# Patient Record
Sex: FEMALE | Race: WHITE | NOT HISPANIC OR LATINO | URBAN - METROPOLITAN AREA
[De-identification: names, ages, dates, MRNs, and addresses within clinical notes are randomized per-mention and may not be internally consistent; named-entity substitution may affect disease eponyms.]

---

## 2018-01-01 ENCOUNTER — INPATIENT (INPATIENT)
Facility: HOSPITAL | Age: 58
LOS: 3 days | Discharge: HOME | DRG: 246 | End: 2018-02-27
Attending: INTERNAL MEDICINE | Admitting: INTERNAL MEDICINE
Payer: COMMERCIAL

## 2018-01-01 ENCOUNTER — TRANSCRIPTION ENCOUNTER (OUTPATIENT)
Age: 58
End: 2018-01-01

## 2018-01-01 VITALS
HEIGHT: 61 IN | HEART RATE: 124 BPM | DIASTOLIC BLOOD PRESSURE: 78 MMHG | RESPIRATION RATE: 20 BRPM | WEIGHT: 231.04 LBS | OXYGEN SATURATION: 95 % | SYSTOLIC BLOOD PRESSURE: 140 MMHG

## 2018-01-01 VITALS — WEIGHT: 223.33 LBS

## 2018-01-01 DIAGNOSIS — E66.01 MORBID (SEVERE) OBESITY DUE TO EXCESS CALORIES: ICD-10-CM

## 2018-01-01 DIAGNOSIS — I48.0 PAROXYSMAL ATRIAL FIBRILLATION: ICD-10-CM

## 2018-01-01 DIAGNOSIS — I50.32 CHRONIC DIASTOLIC (CONGESTIVE) HEART FAILURE: ICD-10-CM

## 2018-01-01 DIAGNOSIS — J44.9 CHRONIC OBSTRUCTIVE PULMONARY DISEASE, UNSPECIFIED: ICD-10-CM

## 2018-01-01 DIAGNOSIS — J18.9 PNEUMONIA, UNSPECIFIED ORGANISM: ICD-10-CM

## 2018-01-01 DIAGNOSIS — Z87.891 PERSONAL HISTORY OF NICOTINE DEPENDENCE: ICD-10-CM

## 2018-01-01 DIAGNOSIS — I25.110 ATHEROSCLEROTIC HEART DISEASE OF NATIVE CORONARY ARTERY WITH UNSTABLE ANGINA PECTORIS: ICD-10-CM

## 2018-01-01 DIAGNOSIS — Z29.9 ENCOUNTER FOR PROPHYLACTIC MEASURES, UNSPECIFIED: ICD-10-CM

## 2018-01-01 DIAGNOSIS — I34.0 NONRHEUMATIC MITRAL (VALVE) INSUFFICIENCY: ICD-10-CM

## 2018-01-01 DIAGNOSIS — I50.33 ACUTE ON CHRONIC DIASTOLIC (CONGESTIVE) HEART FAILURE: ICD-10-CM

## 2018-01-01 DIAGNOSIS — I27.20 PULMONARY HYPERTENSION, UNSPECIFIED: ICD-10-CM

## 2018-01-01 DIAGNOSIS — R07.9 CHEST PAIN, UNSPECIFIED: ICD-10-CM

## 2018-01-01 DIAGNOSIS — I11.0 HYPERTENSIVE HEART DISEASE WITH HEART FAILURE: ICD-10-CM

## 2018-01-01 DIAGNOSIS — I21.4 NON-ST ELEVATION (NSTEMI) MYOCARDIAL INFARCTION: ICD-10-CM

## 2018-01-01 DIAGNOSIS — Z88.0 ALLERGY STATUS TO PENICILLIN: ICD-10-CM

## 2018-01-01 DIAGNOSIS — R94.31 ABNORMAL ELECTROCARDIOGRAM [ECG] [EKG]: ICD-10-CM

## 2018-01-01 DIAGNOSIS — F32.9 MAJOR DEPRESSIVE DISORDER, SINGLE EPISODE, UNSPECIFIED: ICD-10-CM

## 2018-01-01 LAB
ALBUMIN SERPL ELPH-MCNC: 3.8 G/DL — SIGNIFICANT CHANGE UP (ref 3.3–5)
ALBUMIN SERPL ELPH-MCNC: 3.8 G/DL — SIGNIFICANT CHANGE UP (ref 3.3–5)
ALP SERPL-CCNC: 70 U/L — SIGNIFICANT CHANGE UP (ref 40–120)
ALP SERPL-CCNC: 74 U/L — SIGNIFICANT CHANGE UP (ref 40–120)
ALT FLD-CCNC: 22 U/L — SIGNIFICANT CHANGE UP (ref 10–45)
ALT FLD-CCNC: 27 U/L — SIGNIFICANT CHANGE UP (ref 10–45)
ANION GAP SERPL CALC-SCNC: 11 MMOL/L — SIGNIFICANT CHANGE UP (ref 5–17)
ANION GAP SERPL CALC-SCNC: 11 MMOL/L — SIGNIFICANT CHANGE UP (ref 5–17)
ANION GAP SERPL CALC-SCNC: 12 MMOL/L — SIGNIFICANT CHANGE UP (ref 5–17)
APTT BLD: 102.3 SEC — HIGH (ref 27.5–37.4)
APTT BLD: 136 SEC — CRITICAL HIGH (ref 27.5–37.4)
APTT BLD: 34.2 SEC — SIGNIFICANT CHANGE UP (ref 27.5–37.4)
APTT BLD: 40.5 SEC — HIGH (ref 27.5–37.4)
APTT BLD: 45.4 SEC — HIGH (ref 27.5–37.4)
APTT BLD: 54.6 SEC — HIGH (ref 27.5–37.4)
APTT BLD: 57.9 SEC — HIGH (ref 27.5–37.4)
APTT BLD: 62.8 SEC — HIGH (ref 27.5–37.4)
APTT BLD: 63.9 SEC — HIGH (ref 27.5–37.4)
APTT BLD: 74 SEC — HIGH (ref 27.5–37.4)
APTT BLD: 81.2 SEC — HIGH (ref 27.5–37.4)
AST SERPL-CCNC: 18 U/L — SIGNIFICANT CHANGE UP (ref 10–40)
AST SERPL-CCNC: 33 U/L — SIGNIFICANT CHANGE UP (ref 10–40)
BASE EXCESS BLDA CALC-SCNC: 3.3 MMOL/L — HIGH (ref -2–3)
BASOPHILS NFR BLD AUTO: 0.4 % — SIGNIFICANT CHANGE UP (ref 0–2)
BILIRUB SERPL-MCNC: 0.5 MG/DL — SIGNIFICANT CHANGE UP (ref 0.2–1.2)
BILIRUB SERPL-MCNC: 0.6 MG/DL — SIGNIFICANT CHANGE UP (ref 0.2–1.2)
BLD GP AB SCN SERPL QL: NEGATIVE — SIGNIFICANT CHANGE UP
BUN SERPL-MCNC: 12 MG/DL — SIGNIFICANT CHANGE UP (ref 7–23)
BUN SERPL-MCNC: 14 MG/DL — SIGNIFICANT CHANGE UP (ref 7–23)
BUN SERPL-MCNC: 16 MG/DL — SIGNIFICANT CHANGE UP (ref 7–23)
BUN SERPL-MCNC: 18 MG/DL — SIGNIFICANT CHANGE UP (ref 7–23)
BUN SERPL-MCNC: 21 MG/DL — SIGNIFICANT CHANGE UP (ref 7–23)
CALCIUM SERPL-MCNC: 8.5 MG/DL — SIGNIFICANT CHANGE UP (ref 8.4–10.5)
CALCIUM SERPL-MCNC: 8.6 MG/DL — SIGNIFICANT CHANGE UP (ref 8.4–10.5)
CALCIUM SERPL-MCNC: 8.9 MG/DL — SIGNIFICANT CHANGE UP (ref 8.4–10.5)
CALCIUM SERPL-MCNC: 9.2 MG/DL — SIGNIFICANT CHANGE UP (ref 8.4–10.5)
CALCIUM SERPL-MCNC: 9.2 MG/DL — SIGNIFICANT CHANGE UP (ref 8.4–10.5)
CHLORIDE SERPL-SCNC: 100 MMOL/L — SIGNIFICANT CHANGE UP (ref 96–108)
CHLORIDE SERPL-SCNC: 101 MMOL/L — SIGNIFICANT CHANGE UP (ref 96–108)
CHLORIDE SERPL-SCNC: 97 MMOL/L — SIGNIFICANT CHANGE UP (ref 96–108)
CHLORIDE SERPL-SCNC: 98 MMOL/L — SIGNIFICANT CHANGE UP (ref 96–108)
CHLORIDE SERPL-SCNC: 99 MMOL/L — SIGNIFICANT CHANGE UP (ref 96–108)
CHOLEST SERPL-MCNC: 172 MG/DL — SIGNIFICANT CHANGE UP (ref 10–199)
CK MB CFR SERPL CALC: 13.4 NG/ML — HIGH (ref 0–6.7)
CK MB CFR SERPL CALC: 5.4 NG/ML — SIGNIFICANT CHANGE UP (ref 0–6.7)
CK SERPL-CCNC: 312 U/L — HIGH (ref 25–170)
CK SERPL-CCNC: 635 U/L — HIGH (ref 25–170)
CO2 SERPL-SCNC: 26 MMOL/L — SIGNIFICANT CHANGE UP (ref 22–31)
CO2 SERPL-SCNC: 28 MMOL/L — SIGNIFICANT CHANGE UP (ref 22–31)
CO2 SERPL-SCNC: 28 MMOL/L — SIGNIFICANT CHANGE UP (ref 22–31)
CO2 SERPL-SCNC: 29 MMOL/L — SIGNIFICANT CHANGE UP (ref 22–31)
CO2 SERPL-SCNC: 30 MMOL/L — SIGNIFICANT CHANGE UP (ref 22–31)
CREAT SERPL-MCNC: 0.89 MG/DL — SIGNIFICANT CHANGE UP (ref 0.5–1.3)
CREAT SERPL-MCNC: 1.05 MG/DL — SIGNIFICANT CHANGE UP (ref 0.5–1.3)
CREAT SERPL-MCNC: 1.09 MG/DL — SIGNIFICANT CHANGE UP (ref 0.5–1.3)
CREAT SERPL-MCNC: 1.16 MG/DL — SIGNIFICANT CHANGE UP (ref 0.5–1.3)
CREAT SERPL-MCNC: 1.26 MG/DL — SIGNIFICANT CHANGE UP (ref 0.5–1.3)
EOSINOPHIL NFR BLD AUTO: 3 % — SIGNIFICANT CHANGE UP (ref 0–6)
GLUCOSE SERPL-MCNC: 105 MG/DL — HIGH (ref 70–99)
GLUCOSE SERPL-MCNC: 107 MG/DL — HIGH (ref 70–99)
GLUCOSE SERPL-MCNC: 88 MG/DL — SIGNIFICANT CHANGE UP (ref 70–99)
GLUCOSE SERPL-MCNC: 92 MG/DL — SIGNIFICANT CHANGE UP (ref 70–99)
GLUCOSE SERPL-MCNC: 99 MG/DL — SIGNIFICANT CHANGE UP (ref 70–99)
HCO3 BLDA-SCNC: 27 MMOL/L — SIGNIFICANT CHANGE UP (ref 21–28)
HCT VFR BLD CALC: 36.6 % — SIGNIFICANT CHANGE UP (ref 34.5–45)
HCT VFR BLD CALC: 38 % — SIGNIFICANT CHANGE UP (ref 34.5–45)
HCT VFR BLD CALC: 38.9 % — SIGNIFICANT CHANGE UP (ref 34.5–45)
HCT VFR BLD CALC: 40.5 % — SIGNIFICANT CHANGE UP (ref 34.5–45)
HCT VFR BLD CALC: 41.2 % — SIGNIFICANT CHANGE UP (ref 34.5–45)
HDLC SERPL-MCNC: 80 MG/DL — SIGNIFICANT CHANGE UP (ref 40–125)
HGB BLD-MCNC: 11.8 G/DL — SIGNIFICANT CHANGE UP (ref 11.5–15.5)
HGB BLD-MCNC: 12 G/DL — SIGNIFICANT CHANGE UP (ref 11.5–15.5)
HGB BLD-MCNC: 12.5 G/DL — SIGNIFICANT CHANGE UP (ref 11.5–15.5)
HGB BLD-MCNC: 13.2 G/DL — SIGNIFICANT CHANGE UP (ref 11.5–15.5)
HGB BLD-MCNC: 13.5 G/DL — SIGNIFICANT CHANGE UP (ref 11.5–15.5)
INR BLD: 1.03 — SIGNIFICANT CHANGE UP (ref 0.88–1.16)
INR BLD: 1.17 — HIGH (ref 0.88–1.16)
INR BLD: 1.37 — HIGH (ref 0.88–1.16)
LIPID PNL WITH DIRECT LDL SERPL: 74 MG/DL — SIGNIFICANT CHANGE UP
LYMPHOCYTES # BLD AUTO: 22.8 % — SIGNIFICANT CHANGE UP (ref 13–44)
MAGNESIUM SERPL-MCNC: 2 MG/DL — SIGNIFICANT CHANGE UP (ref 1.6–2.6)
MAGNESIUM SERPL-MCNC: 2.1 MG/DL — SIGNIFICANT CHANGE UP (ref 1.6–2.6)
MAGNESIUM SERPL-MCNC: 2.3 MG/DL — SIGNIFICANT CHANGE UP (ref 1.6–2.6)
MCHC RBC-ENTMCNC: 28.4 PG — SIGNIFICANT CHANGE UP (ref 27–34)
MCHC RBC-ENTMCNC: 28.7 PG — SIGNIFICANT CHANGE UP (ref 27–34)
MCHC RBC-ENTMCNC: 28.9 PG — SIGNIFICANT CHANGE UP (ref 27–34)
MCHC RBC-ENTMCNC: 29 PG — SIGNIFICANT CHANGE UP (ref 27–34)
MCHC RBC-ENTMCNC: 29.1 PG — SIGNIFICANT CHANGE UP (ref 27–34)
MCHC RBC-ENTMCNC: 31.6 G/DL — LOW (ref 32–36)
MCHC RBC-ENTMCNC: 32.1 G/DL — SIGNIFICANT CHANGE UP (ref 32–36)
MCHC RBC-ENTMCNC: 32.2 G/DL — SIGNIFICANT CHANGE UP (ref 32–36)
MCHC RBC-ENTMCNC: 32.6 G/DL — SIGNIFICANT CHANGE UP (ref 32–36)
MCHC RBC-ENTMCNC: 32.8 G/DL — SIGNIFICANT CHANGE UP (ref 32–36)
MCV RBC AUTO: 88.4 FL — SIGNIFICANT CHANGE UP (ref 80–100)
MCV RBC AUTO: 89.2 FL — SIGNIFICANT CHANGE UP (ref 80–100)
MCV RBC AUTO: 89.4 FL — SIGNIFICANT CHANGE UP (ref 80–100)
MCV RBC AUTO: 89.5 FL — SIGNIFICANT CHANGE UP (ref 80–100)
MCV RBC AUTO: 89.8 FL — SIGNIFICANT CHANGE UP (ref 80–100)
MONOCYTES NFR BLD AUTO: 9.2 % — SIGNIFICANT CHANGE UP (ref 2–14)
NEUTROPHILS NFR BLD AUTO: 64.6 % — SIGNIFICANT CHANGE UP (ref 43–77)
NT-PROBNP SERPL-SCNC: 1383 PG/ML — HIGH (ref 0–300)
NT-PROBNP SERPL-SCNC: 8166 PG/ML — HIGH (ref 0–300)
PCO2 BLDA: 40 MMHG — SIGNIFICANT CHANGE UP (ref 32–45)
PH BLDA: 7.46 — HIGH (ref 7.35–7.45)
PLATELET # BLD AUTO: 255 K/UL — SIGNIFICANT CHANGE UP (ref 150–400)
PLATELET # BLD AUTO: 265 K/UL — SIGNIFICANT CHANGE UP (ref 150–400)
PLATELET # BLD AUTO: 286 K/UL — SIGNIFICANT CHANGE UP (ref 150–400)
PLATELET # BLD AUTO: 290 K/UL — SIGNIFICANT CHANGE UP (ref 150–400)
PLATELET # BLD AUTO: 292 K/UL — SIGNIFICANT CHANGE UP (ref 150–400)
PO2 BLDA: 68 MMHG — LOW (ref 83–108)
POTASSIUM SERPL-MCNC: 3.6 MMOL/L — SIGNIFICANT CHANGE UP (ref 3.5–5.3)
POTASSIUM SERPL-MCNC: 3.7 MMOL/L — SIGNIFICANT CHANGE UP (ref 3.5–5.3)
POTASSIUM SERPL-MCNC: 3.8 MMOL/L — SIGNIFICANT CHANGE UP (ref 3.5–5.3)
POTASSIUM SERPL-MCNC: 4 MMOL/L — SIGNIFICANT CHANGE UP (ref 3.5–5.3)
POTASSIUM SERPL-MCNC: 4.2 MMOL/L — SIGNIFICANT CHANGE UP (ref 3.5–5.3)
POTASSIUM SERPL-SCNC: 3.6 MMOL/L — SIGNIFICANT CHANGE UP (ref 3.5–5.3)
POTASSIUM SERPL-SCNC: 3.7 MMOL/L — SIGNIFICANT CHANGE UP (ref 3.5–5.3)
POTASSIUM SERPL-SCNC: 3.8 MMOL/L — SIGNIFICANT CHANGE UP (ref 3.5–5.3)
POTASSIUM SERPL-SCNC: 4 MMOL/L — SIGNIFICANT CHANGE UP (ref 3.5–5.3)
POTASSIUM SERPL-SCNC: 4.2 MMOL/L — SIGNIFICANT CHANGE UP (ref 3.5–5.3)
PROT SERPL-MCNC: 7.1 G/DL — SIGNIFICANT CHANGE UP (ref 6–8.3)
PROT SERPL-MCNC: 7.2 G/DL — SIGNIFICANT CHANGE UP (ref 6–8.3)
PROTHROM AB SERPL-ACNC: 11.4 SEC — SIGNIFICANT CHANGE UP (ref 9.8–12.7)
PROTHROM AB SERPL-ACNC: 13 SEC — HIGH (ref 9.8–12.7)
PROTHROM AB SERPL-ACNC: 15.3 SEC — HIGH (ref 9.8–12.7)
RBC # BLD: 4.09 M/UL — SIGNIFICANT CHANGE UP (ref 3.8–5.2)
RBC # BLD: 4.23 M/UL — SIGNIFICANT CHANGE UP (ref 3.8–5.2)
RBC # BLD: 4.35 M/UL — SIGNIFICANT CHANGE UP (ref 3.8–5.2)
RBC # BLD: 4.54 M/UL — SIGNIFICANT CHANGE UP (ref 3.8–5.2)
RBC # BLD: 4.66 M/UL — SIGNIFICANT CHANGE UP (ref 3.8–5.2)
RBC # FLD: 14.2 % — SIGNIFICANT CHANGE UP (ref 10.3–16.9)
RBC # FLD: 14.4 % — SIGNIFICANT CHANGE UP (ref 10.3–16.9)
RBC # FLD: 14.4 % — SIGNIFICANT CHANGE UP (ref 10.3–16.9)
RBC # FLD: 14.6 % — SIGNIFICANT CHANGE UP (ref 10.3–16.9)
RBC # FLD: 14.7 % — SIGNIFICANT CHANGE UP (ref 10.3–16.9)
RH IG SCN BLD-IMP: POSITIVE — SIGNIFICANT CHANGE UP
SAO2 % BLDA: 94 % — LOW (ref 95–100)
SODIUM SERPL-SCNC: 137 MMOL/L — SIGNIFICANT CHANGE UP (ref 135–145)
SODIUM SERPL-SCNC: 138 MMOL/L — SIGNIFICANT CHANGE UP (ref 135–145)
SODIUM SERPL-SCNC: 139 MMOL/L — SIGNIFICANT CHANGE UP (ref 135–145)
SODIUM SERPL-SCNC: 139 MMOL/L — SIGNIFICANT CHANGE UP (ref 135–145)
SODIUM SERPL-SCNC: 141 MMOL/L — SIGNIFICANT CHANGE UP (ref 135–145)
TOTAL CHOLESTEROL/HDL RATIO MEASUREMENT: 2.2 RATIO — LOW (ref 3.3–7.1)
TRIGL SERPL-MCNC: 91 MG/DL — SIGNIFICANT CHANGE UP (ref 10–149)
TROPONIN T SERPL-MCNC: 0.05 NG/ML — CRITICAL HIGH (ref 0–0.01)
TROPONIN T SERPL-MCNC: 0.1 NG/ML — CRITICAL HIGH (ref 0–0.01)
WBC # BLD: 7.8 K/UL — SIGNIFICANT CHANGE UP (ref 3.8–10.5)
WBC # BLD: 8.2 K/UL — SIGNIFICANT CHANGE UP (ref 3.8–10.5)
WBC # BLD: 9 K/UL — SIGNIFICANT CHANGE UP (ref 3.8–10.5)
WBC # BLD: 9.2 K/UL — SIGNIFICANT CHANGE UP (ref 3.8–10.5)
WBC # BLD: 9.5 K/UL — SIGNIFICANT CHANGE UP (ref 3.8–10.5)
WBC # FLD AUTO: 7.8 K/UL — SIGNIFICANT CHANGE UP (ref 3.8–10.5)
WBC # FLD AUTO: 8.2 K/UL — SIGNIFICANT CHANGE UP (ref 3.8–10.5)
WBC # FLD AUTO: 9 K/UL — SIGNIFICANT CHANGE UP (ref 3.8–10.5)
WBC # FLD AUTO: 9.2 K/UL — SIGNIFICANT CHANGE UP (ref 3.8–10.5)
WBC # FLD AUTO: 9.5 K/UL — SIGNIFICANT CHANGE UP (ref 3.8–10.5)

## 2018-01-01 PROCEDURE — 86900 BLOOD TYPING SEROLOGIC ABO: CPT

## 2018-01-01 PROCEDURE — 83735 ASSAY OF MAGNESIUM: CPT

## 2018-01-01 PROCEDURE — 71046 X-RAY EXAM CHEST 2 VIEWS: CPT | Mod: 26

## 2018-01-01 PROCEDURE — 71045 X-RAY EXAM CHEST 1 VIEW: CPT | Mod: 26

## 2018-01-01 PROCEDURE — 99222 1ST HOSP IP/OBS MODERATE 55: CPT

## 2018-01-01 PROCEDURE — 94010 BREATHING CAPACITY TEST: CPT | Mod: 26

## 2018-01-01 PROCEDURE — C1725: CPT

## 2018-01-01 PROCEDURE — 85025 COMPLETE CBC W/AUTO DIFF WBC: CPT

## 2018-01-01 PROCEDURE — 93010 ELECTROCARDIOGRAM REPORT: CPT

## 2018-01-01 PROCEDURE — 36415 COLL VENOUS BLD VENIPUNCTURE: CPT

## 2018-01-01 PROCEDURE — 82550 ASSAY OF CK (CPK): CPT

## 2018-01-01 PROCEDURE — 93005 ELECTROCARDIOGRAM TRACING: CPT

## 2018-01-01 PROCEDURE — C1874: CPT

## 2018-01-01 PROCEDURE — C1887: CPT

## 2018-01-01 PROCEDURE — 82803 BLOOD GASES ANY COMBINATION: CPT

## 2018-01-01 PROCEDURE — 83880 ASSAY OF NATRIURETIC PEPTIDE: CPT

## 2018-01-01 PROCEDURE — 71045 X-RAY EXAM CHEST 1 VIEW: CPT

## 2018-01-01 PROCEDURE — 93306 TTE W/DOPPLER COMPLETE: CPT

## 2018-01-01 PROCEDURE — 93306 TTE W/DOPPLER COMPLETE: CPT | Mod: 26

## 2018-01-01 PROCEDURE — 93880 EXTRACRANIAL BILAT STUDY: CPT

## 2018-01-01 PROCEDURE — 71046 X-RAY EXAM CHEST 2 VIEWS: CPT

## 2018-01-01 PROCEDURE — C1753: CPT

## 2018-01-01 PROCEDURE — 99239 HOSP IP/OBS DSCHRG MGMT >30: CPT

## 2018-01-01 PROCEDURE — 86901 BLOOD TYPING SEROLOGIC RH(D): CPT

## 2018-01-01 PROCEDURE — 90686 IIV4 VACC NO PRSV 0.5 ML IM: CPT

## 2018-01-01 PROCEDURE — 84484 ASSAY OF TROPONIN QUANT: CPT

## 2018-01-01 PROCEDURE — 85027 COMPLETE CBC AUTOMATED: CPT

## 2018-01-01 PROCEDURE — 99233 SBSQ HOSP IP/OBS HIGH 50: CPT

## 2018-01-01 PROCEDURE — 86850 RBC ANTIBODY SCREEN: CPT

## 2018-01-01 PROCEDURE — C1894: CPT

## 2018-01-01 PROCEDURE — 80053 COMPREHEN METABOLIC PANEL: CPT

## 2018-01-01 PROCEDURE — 92978 ENDOLUMINL IVUS OCT C 1ST: CPT | Mod: 26,LC

## 2018-01-01 PROCEDURE — 85730 THROMBOPLASTIN TIME PARTIAL: CPT

## 2018-01-01 PROCEDURE — 93460 R&L HRT ART/VENTRICLE ANGIO: CPT | Mod: 26,XU

## 2018-01-01 PROCEDURE — 93880 EXTRACRANIAL BILAT STUDY: CPT | Mod: 26

## 2018-01-01 PROCEDURE — 85610 PROTHROMBIN TIME: CPT

## 2018-01-01 PROCEDURE — 80061 LIPID PANEL: CPT

## 2018-01-01 PROCEDURE — 94150 VITAL CAPACITY TEST: CPT

## 2018-01-01 PROCEDURE — 82553 CREATINE MB FRACTION: CPT

## 2018-01-01 PROCEDURE — C1769: CPT

## 2018-01-01 PROCEDURE — 80048 BASIC METABOLIC PNL TOTAL CA: CPT

## 2018-01-01 PROCEDURE — 92928 PRQ TCAT PLMT NTRAC ST 1 LES: CPT | Mod: LC

## 2018-01-01 RX ORDER — CLOPIDOGREL BISULFATE 75 MG/1
600 TABLET, FILM COATED ORAL ONCE
Qty: 0 | Refills: 0 | Status: COMPLETED | OUTPATIENT
Start: 2018-01-01 | End: 2018-01-01

## 2018-01-01 RX ORDER — NYSTATIN CREAM 100000 [USP'U]/G
1 CREAM TOPICAL THREE TIMES A DAY
Qty: 0 | Refills: 0 | Status: DISCONTINUED | OUTPATIENT
Start: 2018-01-01 | End: 2018-01-01

## 2018-01-01 RX ORDER — APIXABAN 2.5 MG/1
1 TABLET, FILM COATED ORAL
Qty: 0 | Refills: 0 | COMMUNITY

## 2018-01-01 RX ORDER — ESCITALOPRAM OXALATE 10 MG/1
1 TABLET, FILM COATED ORAL
Qty: 0 | Refills: 0 | COMMUNITY

## 2018-01-01 RX ORDER — ASPIRIN/CALCIUM CARB/MAGNESIUM 324 MG
81 TABLET ORAL DAILY
Qty: 0 | Refills: 0 | Status: DISCONTINUED | OUTPATIENT
Start: 2018-01-01 | End: 2018-01-01

## 2018-01-01 RX ORDER — NYSTATIN CREAM 100000 [USP'U]/G
1 CREAM TOPICAL
Qty: 0 | Refills: 0 | COMMUNITY
Start: 2018-01-01

## 2018-01-01 RX ORDER — POTASSIUM CHLORIDE 20 MEQ
40 PACKET (EA) ORAL ONCE
Qty: 0 | Refills: 0 | Status: COMPLETED | OUTPATIENT
Start: 2018-01-01 | End: 2018-01-01

## 2018-01-01 RX ORDER — APIXABAN 2.5 MG/1
5 TABLET, FILM COATED ORAL EVERY 12 HOURS
Qty: 0 | Refills: 0 | Status: DISCONTINUED | OUTPATIENT
Start: 2018-01-01 | End: 2018-01-01

## 2018-01-01 RX ORDER — AZITHROMYCIN 500 MG/1
250 TABLET, FILM COATED ORAL DAILY
Qty: 0 | Refills: 0 | Status: DISCONTINUED | OUTPATIENT
Start: 2018-01-01 | End: 2018-01-01

## 2018-01-01 RX ORDER — AMIODARONE HYDROCHLORIDE 400 MG/1
1 TABLET ORAL
Qty: 0 | Refills: 0 | COMMUNITY

## 2018-01-01 RX ORDER — MAGNESIUM OXIDE 400 MG ORAL TABLET 241.3 MG
800 TABLET ORAL ONCE
Qty: 0 | Refills: 0 | Status: COMPLETED | OUTPATIENT
Start: 2018-01-01 | End: 2018-01-01

## 2018-01-01 RX ORDER — AMIODARONE HYDROCHLORIDE 400 MG/1
200 TABLET ORAL DAILY
Qty: 0 | Refills: 0 | Status: DISCONTINUED | OUTPATIENT
Start: 2018-01-01 | End: 2018-01-01

## 2018-01-01 RX ORDER — CLOPIDOGREL BISULFATE 75 MG/1
75 TABLET, FILM COATED ORAL DAILY
Qty: 0 | Refills: 0 | Status: DISCONTINUED | OUTPATIENT
Start: 2018-01-01 | End: 2018-01-01

## 2018-01-01 RX ORDER — ATORVASTATIN CALCIUM 80 MG/1
40 TABLET, FILM COATED ORAL AT BEDTIME
Qty: 0 | Refills: 0 | Status: DISCONTINUED | OUTPATIENT
Start: 2018-01-01 | End: 2018-01-01

## 2018-01-01 RX ORDER — ATORVASTATIN CALCIUM 80 MG/1
1 TABLET, FILM COATED ORAL
Qty: 30 | Refills: 3 | OUTPATIENT
Start: 2018-01-01 | End: 2018-06-26

## 2018-01-01 RX ORDER — FUROSEMIDE 40 MG
20 TABLET ORAL
Qty: 0 | Refills: 0 | Status: DISCONTINUED | OUTPATIENT
Start: 2018-01-01 | End: 2018-01-01

## 2018-01-01 RX ORDER — INFLUENZA VIRUS VACCINE 15; 15; 15; 15 UG/.5ML; UG/.5ML; UG/.5ML; UG/.5ML
0.5 SUSPENSION INTRAMUSCULAR ONCE
Qty: 0 | Refills: 0 | Status: COMPLETED | OUTPATIENT
Start: 2018-01-01 | End: 2018-01-01

## 2018-01-01 RX ORDER — METOPROLOL TARTRATE 50 MG
1 TABLET ORAL
Qty: 0 | Refills: 0 | COMMUNITY

## 2018-01-01 RX ORDER — ASPIRIN/CALCIUM CARB/MAGNESIUM 324 MG
1 TABLET ORAL
Qty: 30 | Refills: 0 | OUTPATIENT
Start: 2018-01-01 | End: 2018-03-28

## 2018-01-01 RX ORDER — METOPROLOL TARTRATE 50 MG
1 TABLET ORAL
Qty: 30 | Refills: 3 | OUTPATIENT
Start: 2018-01-01 | End: 2018-06-26

## 2018-01-01 RX ORDER — METOPROLOL TARTRATE 50 MG
25 TABLET ORAL THREE TIMES A DAY
Qty: 0 | Refills: 0 | Status: DISCONTINUED | OUTPATIENT
Start: 2018-01-01 | End: 2018-01-01

## 2018-01-01 RX ORDER — LOSARTAN POTASSIUM 100 MG/1
1 TABLET, FILM COATED ORAL
Qty: 0 | Refills: 0 | COMMUNITY

## 2018-01-01 RX ORDER — ASPIRIN/CALCIUM CARB/MAGNESIUM 324 MG
243 TABLET ORAL ONCE
Qty: 0 | Refills: 0 | Status: COMPLETED | OUTPATIENT
Start: 2018-01-01 | End: 2018-01-01

## 2018-01-01 RX ORDER — HEPARIN SODIUM 5000 [USP'U]/ML
4000 INJECTION INTRAVENOUS; SUBCUTANEOUS EVERY 6 HOURS
Qty: 0 | Refills: 0 | Status: DISCONTINUED | OUTPATIENT
Start: 2018-01-01 | End: 2018-01-01

## 2018-01-01 RX ORDER — CLOPIDOGREL BISULFATE 75 MG/1
1 TABLET, FILM COATED ORAL
Qty: 30 | Refills: 11 | OUTPATIENT
Start: 2018-01-01 | End: 2019-02-21

## 2018-01-01 RX ORDER — HEPARIN SODIUM 5000 [USP'U]/ML
8500 INJECTION INTRAVENOUS; SUBCUTANEOUS EVERY 6 HOURS
Qty: 0 | Refills: 0 | Status: DISCONTINUED | OUTPATIENT
Start: 2018-01-01 | End: 2018-01-01

## 2018-01-01 RX ORDER — HEPARIN SODIUM 5000 [USP'U]/ML
1400 INJECTION INTRAVENOUS; SUBCUTANEOUS
Qty: 25000 | Refills: 0 | Status: DISCONTINUED | OUTPATIENT
Start: 2018-01-01 | End: 2018-01-01

## 2018-01-01 RX ORDER — ESCITALOPRAM OXALATE 10 MG/1
10 TABLET, FILM COATED ORAL DAILY
Qty: 0 | Refills: 0 | Status: DISCONTINUED | OUTPATIENT
Start: 2018-01-01 | End: 2018-01-01

## 2018-01-01 RX ORDER — HEPARIN SODIUM 5000 [USP'U]/ML
1300 INJECTION INTRAVENOUS; SUBCUTANEOUS
Qty: 25000 | Refills: 0 | Status: DISCONTINUED | OUTPATIENT
Start: 2018-01-01 | End: 2018-01-01

## 2018-01-01 RX ORDER — FUROSEMIDE 40 MG
1 TABLET ORAL
Qty: 0 | Refills: 0 | COMMUNITY

## 2018-01-01 RX ORDER — LOSARTAN POTASSIUM 100 MG/1
25 TABLET, FILM COATED ORAL DAILY
Qty: 0 | Refills: 0 | Status: DISCONTINUED | OUTPATIENT
Start: 2018-01-01 | End: 2018-01-01

## 2018-01-01 RX ORDER — DILTIAZEM HCL 120 MG
20 CAPSULE, EXT RELEASE 24 HR ORAL
Qty: 125 | Refills: 0 | Status: DISCONTINUED | OUTPATIENT
Start: 2018-01-01 | End: 2018-01-01

## 2018-01-01 RX ADMIN — Medication 20 MILLIGRAM(S): at 17:26

## 2018-01-01 RX ADMIN — Medication 20 MILLIGRAM(S): at 22:10

## 2018-01-01 RX ADMIN — HEPARIN SODIUM 1200 UNIT(S)/HR: 5000 INJECTION INTRAVENOUS; SUBCUTANEOUS at 03:55

## 2018-01-01 RX ADMIN — INFLUENZA VIRUS VACCINE 0.5 MILLILITER(S): 15; 15; 15; 15 SUSPENSION INTRAMUSCULAR at 11:47

## 2018-01-01 RX ADMIN — APIXABAN 5 MILLIGRAM(S): 2.5 TABLET, FILM COATED ORAL at 05:22

## 2018-01-01 RX ADMIN — Medication 25 MILLIGRAM(S): at 13:34

## 2018-01-01 RX ADMIN — HEPARIN SODIUM 1400 UNIT(S)/HR: 5000 INJECTION INTRAVENOUS; SUBCUTANEOUS at 01:07

## 2018-01-01 RX ADMIN — Medication 40 MILLIEQUIVALENT(S): at 09:37

## 2018-01-01 RX ADMIN — NYSTATIN CREAM 1 APPLICATION(S): 100000 CREAM TOPICAL at 21:44

## 2018-01-01 RX ADMIN — Medication 25 MILLIGRAM(S): at 21:08

## 2018-01-01 RX ADMIN — Medication 20 MILLIGRAM(S): at 05:21

## 2018-01-01 RX ADMIN — AMIODARONE HYDROCHLORIDE 200 MILLIGRAM(S): 400 TABLET ORAL at 12:39

## 2018-01-01 RX ADMIN — ATORVASTATIN CALCIUM 40 MILLIGRAM(S): 80 TABLET, FILM COATED ORAL at 21:52

## 2018-01-01 RX ADMIN — Medication 81 MILLIGRAM(S): at 07:16

## 2018-01-01 RX ADMIN — HEPARIN SODIUM 1200 UNIT(S)/HR: 5000 INJECTION INTRAVENOUS; SUBCUTANEOUS at 21:09

## 2018-01-01 RX ADMIN — Medication 25 MILLIGRAM(S): at 21:44

## 2018-01-01 RX ADMIN — Medication 20 MILLIGRAM(S): at 18:19

## 2018-01-01 RX ADMIN — HEPARIN SODIUM 0 UNIT(S)/HR: 5000 INJECTION INTRAVENOUS; SUBCUTANEOUS at 00:40

## 2018-01-01 RX ADMIN — Medication 20 MG/HR: at 19:50

## 2018-01-01 RX ADMIN — LOSARTAN POTASSIUM 25 MILLIGRAM(S): 100 TABLET, FILM COATED ORAL at 05:18

## 2018-01-01 RX ADMIN — HEPARIN SODIUM 1700 UNIT(S)/HR: 5000 INJECTION INTRAVENOUS; SUBCUTANEOUS at 15:47

## 2018-01-01 RX ADMIN — Medication 25 MILLIGRAM(S): at 19:40

## 2018-01-01 RX ADMIN — Medication 25 MILLIGRAM(S): at 05:18

## 2018-01-01 RX ADMIN — LOSARTAN POTASSIUM 25 MILLIGRAM(S): 100 TABLET, FILM COATED ORAL at 05:21

## 2018-01-01 RX ADMIN — Medication 81 MILLIGRAM(S): at 11:28

## 2018-01-01 RX ADMIN — LOSARTAN POTASSIUM 25 MILLIGRAM(S): 100 TABLET, FILM COATED ORAL at 05:22

## 2018-01-01 RX ADMIN — ATORVASTATIN CALCIUM 40 MILLIGRAM(S): 80 TABLET, FILM COATED ORAL at 21:08

## 2018-01-01 RX ADMIN — ATORVASTATIN CALCIUM 40 MILLIGRAM(S): 80 TABLET, FILM COATED ORAL at 21:44

## 2018-01-01 RX ADMIN — AMIODARONE HYDROCHLORIDE 200 MILLIGRAM(S): 400 TABLET ORAL at 05:21

## 2018-01-01 RX ADMIN — HEPARIN SODIUM 1200 UNIT(S)/HR: 5000 INJECTION INTRAVENOUS; SUBCUTANEOUS at 14:36

## 2018-01-01 RX ADMIN — NYSTATIN CREAM 1 APPLICATION(S): 100000 CREAM TOPICAL at 05:23

## 2018-01-01 RX ADMIN — Medication 25 MILLIGRAM(S): at 05:16

## 2018-01-01 RX ADMIN — Medication 20 MILLIGRAM(S): at 05:16

## 2018-01-01 RX ADMIN — HEPARIN SODIUM 1500 UNIT(S)/HR: 5000 INJECTION INTRAVENOUS; SUBCUTANEOUS at 02:36

## 2018-01-01 RX ADMIN — HEPARIN SODIUM 1300 UNIT(S)/HR: 5000 INJECTION INTRAVENOUS; SUBCUTANEOUS at 19:40

## 2018-01-01 RX ADMIN — Medication 40 MILLIEQUIVALENT(S): at 11:45

## 2018-01-01 RX ADMIN — HEPARIN SODIUM 1400 UNIT(S)/HR: 5000 INJECTION INTRAVENOUS; SUBCUTANEOUS at 07:45

## 2018-01-01 RX ADMIN — Medication 20 MILLIGRAM(S): at 05:22

## 2018-01-01 RX ADMIN — Medication 20 MILLIGRAM(S): at 19:29

## 2018-01-01 RX ADMIN — LOSARTAN POTASSIUM 25 MILLIGRAM(S): 100 TABLET, FILM COATED ORAL at 05:16

## 2018-01-01 RX ADMIN — CLOPIDOGREL BISULFATE 600 MILLIGRAM(S): 75 TABLET, FILM COATED ORAL at 10:43

## 2018-01-01 RX ADMIN — AMIODARONE HYDROCHLORIDE 200 MILLIGRAM(S): 400 TABLET ORAL at 05:22

## 2018-01-01 RX ADMIN — HEPARIN SODIUM 4000 UNIT(S): 5000 INJECTION INTRAVENOUS; SUBCUTANEOUS at 02:40

## 2018-01-01 RX ADMIN — Medication 81 MILLIGRAM(S): at 11:45

## 2018-01-01 RX ADMIN — MAGNESIUM OXIDE 400 MG ORAL TABLET 800 MILLIGRAM(S): 241.3 TABLET ORAL at 11:45

## 2018-01-01 RX ADMIN — CLOPIDOGREL BISULFATE 75 MILLIGRAM(S): 75 TABLET, FILM COATED ORAL at 11:47

## 2018-01-01 RX ADMIN — Medication 20 MILLIGRAM(S): at 05:18

## 2018-01-01 RX ADMIN — Medication 25 MILLIGRAM(S): at 06:31

## 2018-01-01 RX ADMIN — Medication 25 MILLIGRAM(S): at 05:21

## 2018-01-01 RX ADMIN — Medication 243 MILLIGRAM(S): at 10:43

## 2018-01-01 RX ADMIN — Medication 25 MILLIGRAM(S): at 21:52

## 2018-01-01 RX ADMIN — HEPARIN SODIUM 1700 UNIT(S)/HR: 5000 INJECTION INTRAVENOUS; SUBCUTANEOUS at 09:08

## 2018-02-23 NOTE — H&P ADULT - PROBLEM SELECTOR PLAN 2
- paroxysmal afib with RVR to 130s. Pt normally NSR at home  - pt on Amiodarone 200mg daily, Toprol XL 25mg daily. Pt on Cardizem drip at 20ml/hr on admission. Started Lopressor 25mg every 8 hours and off titrating Cardizem drip.

## 2018-02-23 NOTE — H&P ADULT - HISTORY OF PRESENT ILLNESS
58 yo female, former smoker, PMHx HTN, chronic diastolic CHF, paroxysmal afib (on Eliquis), COPD (no hosp/intubations/no meds at home), depression presented to [place] on 2/23/18 endorsing sudden onset of shortness of breath lasting 1 hour prior to admission. 56 yo morbidly obese female, former smoker, PMHx HTN, chronic diastolic CHF, paroxysmal afib (on Eliquis), COPD (no hosp/intubations/no meds at home), depression presented to [place] on 2/22/18 endorsing sudden onset of shortness of breath lasting 1 hour prior to admission. Pt has baseline 2 pillow orthopnea. Denied chest pain, dizziness, palpitations, nausea, syncope, LE swelling. Pt was admitted for NSTEMI with acute on chronic diastolic CHF exacerbation in the setting of rapid afib. Pt required BIPAP for respiratory support and was transitioned to NC. Pt put on Heparin drip for AC and NSTEMI and Cardizem drip for HR control. Echo on 2/23/18 showed EF 50-55% with diastolic dysfunction and moderate MR. Technically limited study. Pt was transferred to [facility] on 2/23/18 for cardiac intervention. On arrival to [facility] pt's HR 130s on Cardizem 15cc/hr. Hep drip at 12cc/hr. PTT 45. Pt's Cardizem drip increased to 20cc/hr and Heparin drip increased to 13cc/hr. SBP in 140s. Pt on 4 LNC O2 sat 94%. Pt afebrile and asymptomatic on transfer. 58 yo morbidly obese female, former smoker, PMHx HTN, chronic diastolic CHF, paroxysmal afib (on Eliquis), COPD (no hosp/intubations/no meds at home), depression presented to [place] on 2/22/18 endorsing sudden onset of shortness of breath lasting 1 hour prior to admission. Pt has baseline 2 pillow orthopnea. Denied chest pain, dizziness, palpitations, nausea, syncope, LE swelling, fever, chills. Pt was admitted for respiratory distress requiring BIPAP. WBC 13.1. XRay chest showed diffuse lung infiltrates and pulmonary edema. CT chest 2/22 showed CHF/fluid overload but cannot r/o PNA. Pulmonary artery hypertension. UA negative. Pt was given Meropenem 1g IV, Rocephin x 1, and Azithromycin 500mg x 1. Pt was weaned off BIPAP to NC on 2/22 PM. Pt ruled in NSTEMI with peak troponin of 2.45 and was started on a heparin drip. EKG in afib had nonspecific ST changes. Pt converted to NSR on 2/23/18 at 10am with no ischemic changes. Pt converted back to Afib with RVR and was put on a Cardizem drip on 2/23 at 4am. Echo on 2/23/18, technically limited study, EF 50-55%, mild LVH, and moderate MR. Pt was transferred to [facility] for cardiac catheterization. On arrival to [facility] pt asymptomatic. O2 sat 96% on 4LNC. Pt afebrile, no leukocytosis. EKG Afib with RVR to 120s with nonspecifc ST changes. Lungs CTA b/l. CXR shows mild increased vascular markings. No consolidations. Pt denies chest pain, sob, dizziness, cough, dizziness, palpitations, nausea. Dr. Dumont contacted who would like her HR better controlled prior to cardiac catheterization. Plan is to do cardiac cath on Monday with Dr Evans.

## 2018-02-23 NOTE — H&P ADULT - PROBLEM SELECTOR PLAN 4
- CXR and CT chest at [place] can't r/o PNA  - Pt was given Meropenem 1g x 1, Rocephin x 1, and Azithromycin 500mg on 2/22 and 2/23 on admission at [place] for sepsis protocol 2/2 pt tachycardic (in rapid afib), WBC 13.1, and hypoxic and hypercapnic, needing BIPAP.   -On arrival to [facility] pt asymptomatic, afebrile, no leukocytosis, and CXR showed no consolidations/infiltrates.  - UA negative at [place].  - Finish 5 days of Azithromycin.

## 2018-02-23 NOTE — H&P ADULT - PMH
Chronic diastolic CHF (congestive heart failure)    NSTEMI (non-ST elevated myocardial infarction)    Paroxysmal atrial fibrillation

## 2018-02-23 NOTE — H&P ADULT - FAMILY HISTORY
Mother  Still living? Unknown  Family history of coronary artery disease, Age at diagnosis: Age Unknown     Sibling  Still living? Unknown  Family history of coronary artery disease, Age at diagnosis: Age Unknown

## 2018-02-23 NOTE — H&P ADULT - ASSESSMENT
58 yo female, former smoker, PMHx HTN, chronic diastolic CHF, paroxysmal afib (on Eliquis), COPD (no hosp/intubations/no meds at home), depression presented to [place] on 2/22/18 endorsing sudden onset of shortness of breath lasting 1 hour prior to admission. Denied chest pain, dizziness, palpitations, nausea, syncope, LE swelling. Pt was admitted for NSTEMI with acute on chronic diastolic CHF exacerbation in the setting of rapid afib. Pt required BIPAP for respiratory support and was transitioned to NC. Pt put on Heparin drip for AC and NSTEMI and Cardizem drip for HR control. Echo on 2/23/18 showed EF 50-55% with diastolic dysfunction and moderate MR. Technically limited study. Pt was transferred to [facility] on 2/23/18 for cardiac intervention. On arrival to [facility] pt's HR 130s on Cardizem 15cc/hr. Hep drip at 12cc/hr. PTT 45. Pt's Cardizem drip increased to 20cc/hr and Heparin drip increased to 13cc/hr. SBP in 140s. Pt on 4 LNC O2 sat 94%. Pt afebrile and asymptomatic on transfer. 58 yo morbidly obese female, former smoker, PMHx HTN, chronic diastolic CHF, paroxysmal afib (on Eliquis), COPD (no hosp/intubations/no meds at home), depression presented to [place] on 2/22/18 endorsing sudden onset of shortness of breath lasting 1 hour prior to admission. Pt has baseline 2 pillow orthopnea. Denied chest pain, dizziness, palpitations, nausea, syncope, LE swelling, fever, chills. Pt was admitted for respiratory distress requiring BIPAP. WBC 13.1. XRay chest showed diffuse lung infiltrates and pulmonary edema. CT chest 2/22 showed CHF/fluid overload but cannot r/o PNA. Pulmonary artery hypertension. UA negative. Pt was given Meropenem 1g IV, Rocephin x 1, and Azithromycin 500mg x 1. Pt was weaned off BIPAP to NC on 2/22 PM. Pt ruled in NSTEMI with peak troponin of 2.45 and was started on a heparin drip. EKG in afib had nonspecific ST changes. Pt converted to NSR on 2/23/18 at 10am with no ischemic changes. Pt converted back to Afib with RVR and was put on a Cardizem drip on 2/23 at 4am. Echo on 2/23/18, technically limited study, EF 50-55%, mild LVH, and moderate MR. Pt was transferred to [facility] for cardiac catheterization. On arrival to [facility] pt asymptomatic. O2 sat 96% on 4LNC. Pt afebrile, no leukocytosis. EKG Afib with RVR to 120s with nonspecifc ST changes. Lungs CTA b/l. CXR shows mild increased vascular markings. No consolidations. Pt denies chest pain, sob, dizziness, cough, dizziness, palpitations, nausea. Dr. Dumont contacted who would like her HR better controlled prior to cardiac catheterization. Plan is to do cardiac cath on Monday with Dr Evans.

## 2018-02-23 NOTE — H&P ADULT - PROBLEM SELECTOR PLAN 3
- Chest Xray and CT chest no contrast showed fluid overload but couldn't r/o PNA  - Pt on Lasix 20mg daily at home. BNP 8000s on transfer to [facility].   - Start Lasix 20mg IV BID inpatient. Strict Is and Os. No patel as pt is mobile.  - Echo at [place] technically limited study with EF 50-55%. Mod MR. Echo ordered to be done on 2/24/18 with Definity for better study.

## 2018-02-23 NOTE — H&P ADULT - PROBLEM SELECTOR PLAN 1
- pt r/i NSTEMI with peak trop 1.8. - pt r/i NSTEMI with peak trop 2.45 at [place]  - Started on Heparin drip for ACS and AC  - Transferred for cardiac cath. Pt in rapid afib on 2/23 PM and cath was deferred to Monday with Dr. Evans. Confirm with Dr. Evans about load. Consent in chart.   - Continue Aspirin 81mg daily

## 2018-02-24 NOTE — DIETITIAN INITIAL EVALUATION ADULT. - NS AS NUTRI INTERV ED CONTENT
cardiac medical nutrition therapy and weight management nutrition therapy/Purpose of the nutrition education

## 2018-02-24 NOTE — DIETITIAN INITIAL EVALUATION ADULT. - PROBLEM SELECTOR PLAN 1
- pt r/i NSTEMI with peak trop 2.45 at [place]  - Started on Heparin drip for ACS and AC  - Transferred for cardiac cath. Pt in rapid afib on 2/23 PM and cath was deferred to Monday with Dr. Evans. Confirm with Dr. Evans about load. Consent in chart.   - Continue Aspirin 81mg daily

## 2018-02-24 NOTE — DIETITIAN INITIAL EVALUATION ADULT. - OTHER INFO
58yo F w/ h/o diastolic CHF (chronic), depression, presented to [place] on 2/22/18 with SOB and orthopnea. Now being treated for CHF/fluid overload -cannot r/o PNA. Pt currently on DASH/TLC diet and tolerating PO. Endorses good appetite, consuming >75% meals. Denies GI distress. Pt reports adhering to a lower sodium diet in the past but as of lately cooks with salt and consumes high Na pre-packaged foods. Experiences difficulty with losing weight and finding time to cook fresh meals. RD provided written and oral edu on cardiac nutition therapy and meal planning. Pt was receptive and expressed understanding. Pt inquired about wt management as well. NKFA or dietary restrictions. Skin and GI WDL per flowsheet.

## 2018-02-24 NOTE — PROGRESS NOTE ADULT - PROBLEM SELECTOR PLAN 2
- paroxysmal afib with RVR to 130s. Pt normally NSR at home  - pt on Amiodarone 200mg daily, Toprol XL 25mg daily. Pt on Cardizem drip at 20ml/hr on admission. Started Lopressor 25mg every 8 hours and off titrating Cardizem drip. - in NSR currently. No conversion pauses. AFIB was started about 2 years ago; was less in frequency but has been more recently.   - COntinue Metoprolol 25 mg TID  - On amiodarone at home. Got a dose this morning.  - Due to prolonged QTc, I d/c Amio order for - in NSR currently. No conversion pauses. AFIB was started about 2 years ago; was less in frequency but has been more recently.   - Continue Metoprolol 25 mg TID  - On amiodarone 200 mg daily at home. Got a dose this morning.  - Due to prolonged QTc, I discontinued Amio order until further notice from EP. DR. Wei is called for consult - question is what to do about Amio.

## 2018-02-24 NOTE — DIETITIAN INITIAL EVALUATION ADULT. - PROBLEM SELECTOR PLAN 3
- Chest Xray and CT chest no contrast showed fluid overload but couldn't r/o PNA  - Pt on Lasix 20mg daily at home. BNP 8000s on transfer to [facility].   - Start Lasix 20mg IV BID inpatient. Strict Is and Os. No patel as pt is mobile.  - Echo at [place] technically limited study with EF 50-55%. Mod MR. Echo ordered to be done on 2/24/18 with Definity for better study.

## 2018-02-24 NOTE — PROGRESS NOTE ADULT - PROBLEM SELECTOR PLAN 4
- CXR and CT chest at [place] can't r/o PNA  - Pt was given Meropenem 1g x 1, Rocephin x 1, and Azithromycin 500mg on 2/22 and 2/23 on admission at [place] for sepsis protocol 2/2 pt tachycardic (in rapid afib), WBC 13.1, and hypoxic and hypercapnic, needing BIPAP.   -On arrival to [facility] pt asymptomatic, afebrile, no leukocytosis, and CXR showed no consolidations/infiltrates.  - UA negative at [place].  - Finish 5 days of Azithromycin. - Chest Xray and CT chest no contrast showed fluid overload   - Pt on Lasix 20mg daily at home. BNP 8000s on transfer to [facility].   - Start Lasix 20mg IV BID inpatient. Strict Is and Os. No patel as pt is mobile.  - Echo at [place] technically limited study with EF 50-55%. Mod MR. Echo ordered to be done on 2/24/18 with Definity for better study. - Chest Xray and CT chest no contrast showed fluid overload   - Pt on Lasix 20mg daily at home. BNP 8000s on transfer to [facility].   - Start Lasix 20mg IV BID inpatient. Strict Is and Os. No patel as pt is mobile.  - Goal is for negative 1-1.5 liter per today   - Echo at [place] technically limited study with EF 50-55%. Mod MR. Echo ordered to be done on 2/24/18 with Definity for better study.

## 2018-02-24 NOTE — PROGRESS NOTE ADULT - PROBLEM SELECTOR PLAN 3
- Chest Xray and CT chest no contrast showed fluid overload but couldn't r/o PNA  - Pt on Lasix 20mg daily at home. BNP 8000s on transfer to [facility].   - Start Lasix 20mg IV BID inpatient. Strict Is and Os. No patel as pt is mobile.  - Echo at [place] technically limited study with EF 50-55%. Mod MR. Echo ordered to be done on 2/24/18 with Definity for better study. QTc today 580ms  with NSR at 62 bpm.   EKG from [place]:  on 2/22/18 showed NSR at 79 bpm with QTc 493 ms  on 2/23/18 showed NSR at 80 bpm with QTc 530 ms.   Plan to stop Azithromycin and Lexapro.  No need for further antibiotics as pt has no clinical symptoms suspected of PNA.   - Dr. Wei is called for evaluation for Amio continuation in setting of prolonged QTc   - Plan for daily EKG while here   - Keep K > 4 (supplemented today) and mg > 2

## 2018-02-24 NOTE — PROGRESS NOTE ADULT - SUBJECTIVE AND OBJECTIVE BOX
5 uris Cardiology Progress Note    S: No complaints or event overnight.  Denies fever / chill / cough / SOB at rest / Chest pain.     TELE: paroxysmal AFIB (VR when in AF 130s range). No conversion pauses.  Currently in NSR with HR 50-60s bpm at rest   EK18     O: T(C): 35.8 (18 @ 09:41), Max: 36.5 (18 @ 01:01)  HR: 61 (18 @ 11:43) (57 - 124)  BP: 114/62 (18 @ 11:43) (57/- - 146/78)  RR: 18 (18 @ 11:43) (16 - 20)  SpO2: 99% (18 @ 11:43) (95% - 99%)    PHYSICAL  General:  NAD        Chest:  CTA B/L   Cardiac:   + s1/s2, RRR  Abdomen:  +BS , soft ND/NT  Extremities: No edema    LABS:                        11.8   8.2   )-----------( 255      ( 2018 08:01 )             36.6         139  |  99  |  14  ----------------------------<  107<H>  3.6   |  28  |  1.05    Ca    8.5      2018 08:01  Mg     2.0         TPro  7.2  /  Alb  3.8  /  TBili  0.6  /  DBili  x   /  AST  33  /  ALT  27  /  AlkPhos  70      PT/INR - ( 2018 18:04 )   PT: 13.0 sec;   INR: 1.17          PTT - ( 2018 08:34 )  PTT:40.5 sec    MEDICATIONS:  aspirin  chewable 81 milliGRAM(s) Oral daily  atorvastatin 40 milliGRAM(s) Oral at bedtime  furosemide   Injectable 20 milliGRAM(s) IV Push two times a day  heparin  Infusion. 1300 Unit(s)/Hr IV Continuous <Continuous>  heparin  Injectable 8500 Unit(s) IV Push every 6 hours PRN  heparin  Injectable 4000 Unit(s) IV Push every 6 hours PRN  influenza   Vaccine 0.5 milliLiter(s) IntraMuscular once  losartan 25 milliGRAM(s) Oral daily  metoprolol     tartrate 25 milliGRAM(s) Oral three times a day 5 uris Cardiology Progress Note    S: No complaints or event overnight.  Denies fever / chill / cough / SOB at rest / Chest pain.     TELE: paroxysmal AFIB (VR when in AF 130s range). No conversion pauses.  Currently in NSR with HR 50-60s bpm at rest   EK18     O: T(C): 35.8 (18 @ 09:41), Max: 36.5 (18 @ 01:01)  HR: 61 (18 @ 11:43) (57 - 124)  BP: 114/62 (18 @ 11:43) (57/- - 146/78)  RR: 18 (18 @ 11:43) (16 - 20)  SpO2: 99% (18 @ 11:43) (95% - 99%)    PHYSICAL  General:  NAD      neck: No JVD    Chest:  + rales bilaterally. No wheezing. No rhonchi   Cardiac:   + s1/s2, RRR, no murmur    Abdomen:  +BS , soft ND/NT  Extremities: No LE edema bilaterally.   Neuro: AAOx 3. No focal deficit    LABS:                        11.8   8.2   )-----------( 255      ( 2018 08:01 )             36.6         139  |  99  |  14  ----------------------------<  107<H>  3.6   |  28  |  1.05    Ca    8.5      2018 08:01  Mg     2.0         TPro  7.2  /  Alb  3.8  /  TBili  0.6  /  DBili  x   /  AST  33  /  ALT  27  /  AlkPhos  70      PT/INR - ( 2018 18:04 )   PT: 13.0 sec;   INR: 1.17     PTT - ( 2018 08:34 )  PTT:40.5 sec    MEDICATIONS:  aspirin  chewable 81 milliGRAM(s) Oral daily  atorvastatin 40 milliGRAM(s) Oral at bedtime  furosemide   Injectable 20 milliGRAM(s) IV Push two times a day  heparin  Infusion. 1300 Unit(s)/Hr IV Continuous <Continuous>  heparin  Injectable 8500 Unit(s) IV Push every 6 hours PRN  heparin  Injectable 4000 Unit(s) IV Push every 6 hours PRN  influenza   Vaccine 0.5 milliLiter(s) IntraMuscular once  losartan 25 milliGRAM(s) Oral daily  metoprolol     tartrate 25 milliGRAM(s) Oral three times a day

## 2018-02-24 NOTE — PROGRESS NOTE ADULT - ATTENDING COMMENTS
Agree with assessment and plan from PA. Patient with known history of hypertension, PAF on Xarelto/Amiodarone presented to [facility] with acute onset of dyspnea in setting of AF w/ RVR, decompensated heart failure with course c/b NSTEMI. Patient treated for penumonia with Azithromycin/Meropenem/Rocephin. This am feels better. CXR c/w pulmonary vascular congestion. Labs reviewed.   Plan:  #PAF w/ episodes of RVR. ECG noted for prolonged QTc, likely in setting of Azithromycin +Amiodarone. Await EP input re: continuation of Amiodarone given such frequent episodes of PAF which patient does not tolerate well. Okay to d/c azithromycin as no suspicion for pneumonia. Cont heparin gtt pending invasive angiography for Monday. D/c dilt gtt and continue on Lopressor q8h. Will uptitrate as needed.     #NSTEMI/heart failure. Possibly 2/2 demand; however, patient with no prior h/o CAD. Plan for cath with SID Evans on Monday. Cont DAPT. Statin. BB. TTE pending; however, appears HFpEF noted at [place]. Pt appears mildly overloaded on exam. Cont Lasix 20 IV bid with goal -1-1.5L. Pt will require a sleep study as outpatient and likely underlining MANISHA. Will assess while inpatient.

## 2018-02-24 NOTE — DIETITIAN INITIAL EVALUATION ADULT. - ENERGY NEEDS
Height: 5'1" Weight: 229lbs, IBW 105lbs+/-10%, %%, BMI 43.7  IBW used for calculations as pt >120% of IBW   Nutrient needs based on [facility] standards of care for maintenance in adults.

## 2018-02-24 NOTE — DIETITIAN INITIAL EVALUATION ADULT. - ADHERENCE
pt reports cooking with salt and not adhering to cardiac diet outside of [facility]

## 2018-02-24 NOTE — PROGRESS NOTE ADULT - PROBLEM SELECTOR PLAN 1
- pt r/i NSTEMI with peak trop 2.45 at [place]  - Started on Heparin drip for ACS and AC  - Transferred for cardiac cath. Pt in rapid afib on 2/23 PM and cath was deferred to Monday with Dr. Evans. Confirm with Dr. Evans about load. Consent in chart.   - Continue Aspirin 81mg daily - Continue Heparin drip for ACS and paroxysmal AFIB  - Plan for cath on monday.   - need to confirm with Dr. Evans about antiplaletel load. Consent in chart.   - Continue Aspirin 81mg daily

## 2018-02-24 NOTE — PROGRESS NOTE ADULT - ASSESSMENT
58 yo morbidly obese female, former smoker, PMHx HTN, chronic diastolic CHF, paroxysmal afib (on Eliquis), COPD (no hosp/intubations/no meds at home), depression presented to [place] on 2/22/18 endorsing sudden onset of shortness of breath lasting 1 hour prior to admission. Pt has baseline 2 pillow orthopnea. Denied chest pain, dizziness, palpitations, nausea, syncope, LE swelling, fever, chills. Pt was admitted for respiratory distress requiring BIPAP. WBC 13.1. XRay chest showed diffuse lung infiltrates and pulmonary edema. CT chest 2/22 showed CHF/fluid overload but cannot r/o PNA. Pulmonary artery hypertension. UA negative. Pt was given Meropenem 1g IV, Rocephin x 1, and Azithromycin 500mg x 1. Pt was weaned off BIPAP to NC on 2/22 PM. Pt ruled in NSTEMI with peak troponin of 2.45 and was started on a heparin drip. EKG in afib had nonspecific ST changes. Pt converted to NSR on 2/23/18 at 10am with no ischemic changes. Pt converted back to Afib with RVR and was put on a Cardizem drip on 2/23 at 4am. Echo on 2/23/18, technically limited study, EF 50-55%, mild LVH, and moderate MR. Pt was transferred to [facility] for cardiac catheterization. On arrival to [facility] pt asymptomatic. O2 sat 96% on 4LNC. Pt afebrile, no leukocytosis. EKG Afib with RVR to 120s with nonspecifc ST changes. Lungs CTA b/l. CXR shows mild increased vascular markings. No consolidations. Pt denies chest pain, sob, dizziness, cough, dizziness, palpitations, nausea. Dr. Dumont contacted who would like her HR better controlled prior to cardiac catheterization. Plan is to do cardiac cath on Monday with Dr Evans.   58 y/o F with obesity, former smoker, HTN, pAFIB (on Eliquis at home), COPD, diastolic CHF (chronic), depression, presented to [place] on 2/22/18 with SOB and orthopnea.   Ruled in NSTEMI with peak Troponin at [facility] (0.29 --> 1.83). Here Troponin 0.1 on 2/23/17 at arrival to [facility].  She was given meropenem and Azithromycin on 2/22/18 at [facility] based on CT chest there that showed CHF/fluid overload but cannot r/o PNA. 56 y/o F with obesity, former smoker, HTN, pAFIB (on Eliquis at home), COPD, diastolic CHF (chronic), depression, presented to [place] on 2/22/18 with SOB and orthopnea. Lactate acid there at 4.5 on admission.  Ruled in NSTEMI with peak Troponin at [facility] (0.29 --> 1.83). Here Troponin 0.1 on 2/23/17 at arrival to [facility].  She was given meropenem, Rocephine and Azithromycin on 2/22/18 at [place] based on CT chest there that showed CHF/fluid overload but cannot r/o PNA.  She was put on BiPAP and was weaned off it.  Hospital course at [place] also complicated by AFIB RVR episodes and was started on Cardizem gtt there.    She has been in NSR this morning.  Awaiting for cath on monday.   Echo on 2/23/18 at [place], technically limited study, showed EF 50-55%, mild LVH, and moderate MR.

## 2018-02-24 NOTE — DIETITIAN INITIAL EVALUATION ADULT. - PROBLEM SELECTOR PLAN 4
- CXR and CT chest at [place] can't r/o PNA  - Pt was given Meropenem 1g x 1, Rocephin x 1, and Azithromycin 500mg on 2/22 and 2/23 on admission at [place] for sepsis protocol 2/2 pt tachycardic (in rapid afib), WBC 13.1, and hypoxic and hypercapnic, needing BIPAP.   -On arrival to [facility] pt asymptomatic, afebrile, no leukocytosis, and CXR showed no consolidations/infiltrates.  - UA negative at [place].  - Finish 5 days of Azithromycin.

## 2018-02-25 NOTE — PROGRESS NOTE ADULT - ATTENDING COMMENTS
Agree with assessment and plan of PA above. Patient with TTE demonstrated severe LA enlargement with moderate-severe MR. Patient appears relatively euvolemic on exam s/p IV diuresis and ability to sustain SR at this time. In light of her presentation and TTE findings, will consult CT surgery to weigh in on valve findings. It remains unclear if her whole presentation is 2/2 the MR. Await input from CT surgery. She is on schedule for [facility] tomorrow with SID Evans. PA to update him on TTE findings to coordinate appropriate management. Can transition to po Lasix today. Cont current medical regimen. Repeat ECG and if QTc normalized, resume home amiodarone 200 mg po daily. Agree with assessment and plan of PA above. Patient with TTE demonstrated severe LA enlargement with moderate-severe MR. Patient appears relatively euvolemic on exam s/p IV diuresis and ability to sustain SR at this time. In light of her presentation and TTE findings, there is concern for symptomatic valvular disease. Will consult CT surgery to weigh in. She is on schedule for [facility] tomorrow with SID Evans. PA to update him on TTE findings to coordinate appropriate management. Can transition to po Lasix today. Cont current medical regimen. Repeat ECG and if QTc normalized, resume home amiodarone 200 mg po daily.

## 2018-02-25 NOTE — PROGRESS NOTE ADULT - PROBLEM SELECTOR PLAN 1
Continue Heparin drip for ACS and paroxysmal AFIB  - Plan for cath on monday. NPO after midnight  - need to confirm with Dr. Evans about antiplaletel load. Consent in chart.   - Continue Aspirin 81mg daily.

## 2018-02-25 NOTE — CONSULT NOTE ADULT - SUBJECTIVE AND OBJECTIVE BOX
Surgeon:    Requesting Physician:    HISTORY OF PRESENT ILLNESS:  57y Female    PAST MEDICAL & SURGICAL HISTORY:  NSTEMI (non-ST elevated myocardial infarction)  Paroxysmal atrial fibrillation  Chronic diastolic CHF (congestive heart failure)  No significant past surgical history      MEDICATIONS  (STANDING):  aspirin  chewable 81 milliGRAM(s) Oral daily  atorvastatin 40 milliGRAM(s) Oral at bedtime  furosemide    Tablet 20 milliGRAM(s) Oral two times a day  heparin  Infusion. 1400 Unit(s)/Hr (14 mL/Hr) IV Continuous <Continuous>  influenza   Vaccine 0.5 milliLiter(s) IntraMuscular once  losartan 25 milliGRAM(s) Oral daily  metoprolol     tartrate 25 milliGRAM(s) Oral three times a day    MEDICATIONS  (PRN):  heparin  Injectable 8500 Unit(s) IV Push every 6 hours PRN For aPTT less than 40  heparin  Injectable 4000 Unit(s) IV Push every 6 hours PRN For aPTT between 40 - 57      Allergies    penicillins (Unknown)    Intolerances        SOCIAL HISTORY:  Smoker:  YES / NO        PACK YEARS:                         WHEN QUIT?  ETOH use:  YES / NO               FREQUENCY / QUANTITY:  Ilicit Drug use:  YES / NO  Occupation:  Assisted device use (Cane / Walker):  Live with:    FAMILY HISTORY:  Family history of coronary artery disease (Mother, Sibling)      Review of Systems  CONSTITUTIONAL:  Fevers / chills, sweats, fatigue, weight loss, weight gain                                    NEGATIVE  NEURO:  parathesias, seizures, syncope, confusion                                                                               NEGATIVE  EYES:  Blurry vision, discharge, pain, loss of vision                                                                                  NEGATIVE  ENMT:  Difficulty hearing, vertigo, dysphagia, epistaxis, recent dental work                                     NEGATIVE  CV:  Chest pain, palpitations, WEINSTEIN, orthopnea                                                                                         NEGATIVE  RESPIRATORY:  Wheezing, SOB, cough / sputum, hemoptysis                                                              NEGATIVE  GI:  Nausea, vommiting, diarrhea, constipation, melena                                                                        NEGATIVE  : Hematuria, dysuria, urgency, incontinence                                                                                       NEGATIVE  MUSKULOSKELETAL:  arthritis, joint swelling, muscle weakness                                                           NEGATIVE  SKIN/BREAST:  rash, itching, bryce loss, masses                                                                                            NEGATIVE  PSYCH:  depresion, anxiety, suicidal ideation                                                                                             NEGATIVE  HEME/LYMPH:  bruises easily, enlarged lymph nodes, tender lymph nodes                                        NEGATIVE  ENDOCRINE:  cold intolerance, heat intolerance, polydipsia                                                                   NEGATIVE    PHYSICAL EXAM  Vital Signs Last 24 Hrs  T(C): 35.8 (25 Feb 2018 08:25), Max: 36.5 (25 Feb 2018 00:12)  T(F): 96.5 (25 Feb 2018 08:25), Max: 97.7 (25 Feb 2018 00:12)  HR: 58 (25 Feb 2018 11:27) (58 - 69)  BP: 129/61 (25 Feb 2018 11:27) (94/51 - 151/86)  BP(mean): --  RR: 18 (25 Feb 2018 11:27) (16 - 18)  SpO2: 95% (25 Feb 2018 11:27) (94% - 96%)    CONSTITUTIONAL:                                                                          WNL  NEURO:                                                                                             WNL                      EYES:                                                                                                  WNL  ENMT:                                                                                                WNL  CV:                                                                                                      WNL  RESPIRATORY:                                                                                  WNL  GI:                                                                                                       WNL  : CARSON + / -                                                                                 WNL  MUSKULOSKELETAL:                                                                       WNL  SKIN / BREAST:                                                                                 WNL  Extremities  Vascular                                                          LABS:                        12.0   7.8   )-----------( 265      ( 25 Feb 2018 06:58 )             38.0     02-25    139  |  98  |  16  ----------------------------<  92  3.8   |  30  |  1.09    Ca    8.9      25 Feb 2018 06:58  Mg     2.3     02-25    TPro  7.2  /  Alb  3.8  /  TBili  0.6  /  DBili  x   /  AST  33  /  ALT  27  /  AlkPhos  70  02-23    PT/INR - ( 23 Feb 2018 18:04 )   PT: 13.0 sec;   INR: 1.17          PTT - ( 25 Feb 2018 06:58 )  PTT:81.2 sec    CARDIAC MARKERS ( 23 Feb 2018 18:04 )  x     / 0.10 ng/mL / 635 U/L / x     / 13.4 ng/mL          Serum Pro-Brain Natriuretic Peptide: 8166 pg/mL (02-23-18 @ 18:04)      RADIOLOGY & ADDITIONAL STUDIES:  CAROTID U/S:    CXR:    CT Scan:    EKG:    TTE / ARABELLA:    Cardiac Cath: Surgeon: Dr. Galvez     Requesting Physician: Dr. Evans     HISTORY OF PRESENT ILLNESS:  57 year old morbidly obese female, former smoker with PMHx HTN, Chronic diastolic CHF, atrial fibrillation (on eliquis), COPD, Depression presented to [facility] on 2/22/18 complaining of acute onset of SOB and WEINSTEIN while climbing the stairs to her apartment. Patient states that she was going to visit her brother and had shortness of breath while walking up his steps that relieved with rest. She was feeling better and went home where she had to walk up 14 steps to get into her apartment and had acute onset of SOB again requiring her to sit down. She states that over the past 6 months she has had WEINSTEIN with ambulation greater than 1/2 block. She was admitted to [facility] for respiratory distress requiring BIPAP, Xray at that time revealed diffuse lung infiltrates and pulmonary edema and she was treated empirically for pneumonia. She was also ruled in for NSTEMI with peak troponin 2.45 and was started on a heparin drip. At [place] patient had paroxysmal atrial fibrillation with RVR and was placed on a cardizem drip. She was then transferred to [facility] on 2/24 under the care of Dr. Evans for cardiac cath and further evaluation. At [facility], TTE revealed EF 55%, moderate-severe MR and CT surgery was consulted for further management. Patient denies any chest pain, shortness of breath at rest, palpitations, abdominal pain, nausea, vomiting, diarrhea, LE edema, cough, fever or chills.     Home Meds:   Metoprolol 25mg XL   Amiodarone 200mg   Eliquis 5mg BID   Lasix 20mg daily   Lexapro 10mg     PAST MEDICAL & SURGICAL HISTORY:  NSTEMI (non-ST elevated myocardial infarction)  Paroxysmal atrial fibrillation  Chronic diastolic CHF (congestive heart failure)  No significant past surgical history      MEDICATIONS  (STANDING):  aspirin  chewable 81 milliGRAM(s) Oral daily  atorvastatin 40 milliGRAM(s) Oral at bedtime  furosemide    Tablet 20 milliGRAM(s) Oral two times a day  heparin  Infusion. 1400 Unit(s)/Hr (14 mL/Hr) IV Continuous <Continuous>  influenza   Vaccine 0.5 milliLiter(s) IntraMuscular once  losartan 25 milliGRAM(s) Oral daily  metoprolol     tartrate 25 milliGRAM(s) Oral three times a day    MEDICATIONS  (PRN):  heparin  Injectable 8500 Unit(s) IV Push every 6 hours PRN For aPTT less than 40  heparin  Injectable 4000 Unit(s) IV Push every 6 hours PRN For aPTT between 40 - 57      Allergies    penicillins (Unknown)    Intolerances    SOCIAL HISTORY:  Smoker:  Former, quit 14 months ago. 45 pack year history (30 years x 1.5ppd)   ETOH use:  No  Ilicit Drug use:  No  Assisted device use (Cane / Walker): None     FAMILY HISTORY:  Family history of coronary artery disease (Mother, Sibling)    Review of Systems  CONSTITUTIONAL:  Denies Fevers / chills, sweats, fatigue, weight loss, weight gain                                      NEURO:  Denies parathesias, seizures, syncope, confusion                                                                                EYES:  Denies Blurry vision, discharge, pain, loss of vision                                                                                    ENMT:  Denies Difficulty hearing, vertigo, dysphagia, epistaxis, recent dental work                                       CV:  Denies Chest pain, palpitations, WEINSTEIN, orthopnea                                                                                          RESPIRATORY:  Denies Wheezing, SOB, cough / sputum, hemoptysis                                                                GI:  Denies Nausea, vommiting, diarrhea, constipation, melena, difficulty swallowing                                               : Denies Hematuria, dysuria, urgency, incontinence                                                                                         MUSKULOSKELETAL:  Denies arthritis, joint swelling, muscle weakness                                                             SKIN/BREAST:  Denies rash, itching, bryce loss, masses                                                                                            PSYCH:  Denies depresion, anxiety, suicidal ideation                                                                                               HEME/LYMPH:  Denies bruises easily, enlarged lymph nodes, tender lymph nodes                                        ENDOCRINE:  Denies cold intolerance, heat intolerance, polydipsia     PHYSICAL EXAM  Vital Signs Last 24 Hrs  T(C): 35.8 (25 Feb 2018 08:25), Max: 36.5 (25 Feb 2018 00:12)  T(F): 96.5 (25 Feb 2018 08:25), Max: 97.7 (25 Feb 2018 00:12)  HR: 58 (25 Feb 2018 11:27) (58 - 69)  BP: 129/61 (25 Feb 2018 11:27) (94/51 - 151/86)  BP(mean): --  RR: 18 (25 Feb 2018 11:27) (16 - 18)  SpO2: 95% (25 Feb 2018 11:27) (94% - 96%)    CONSTITUTIONAL:   Patient lying comfortably in bed, no acute distress   NEURO:  Alert and oriented. No focal neurological deficits   EYES:    PERRL   ENMT:   WNL   CV:  S1S2, RRR, no murmurs appreciated on exam   RESPIRATORY:  Clear to ausculation bilaterally   GI:  Abdomen soft, non tender, non distended   : No Renae, WNL   MUSKULOSKELETAL:   CASTRO, ROM intact   SKIN / BREAST: No rashes noted   Extremities: warm and well perfused. No edema or calf tenderness   Vascular: Peripheral pulses 2+ bilaterally                                                           LABS:                        12.0   7.8   )-----------( 265      ( 25 Feb 2018 06:58 )             38.0     02-25    139  |  98  |  16  ----------------------------<  92  3.8   |  30  |  1.09    Ca    8.9      25 Feb 2018 06:58  Mg     2.3     02-25    TPro  7.2  /  Alb  3.8  /  TBili  0.6  /  DBili  x   /  AST  33  /  ALT  27  /  AlkPhos  70  02-23    PT/INR - ( 23 Feb 2018 18:04 )   PT: 13.0 sec;   INR: 1.17          PTT - ( 25 Feb 2018 06:58 )  PTT:81.2 sec    CARDIAC MARKERS ( 23 Feb 2018 18:04 )  x     / 0.10 ng/mL / 635 U/L / x     / 13.4 ng/mL    Serum Pro-Brain Natriuretic Peptide: 8166 pg/mL (02-23-18 @ 18:04)      RADIOLOGY & ADDITIONAL STUDIES:    TTE / ARABELLA:< from: Echocardiogram (02.24.18 @ 16:29) >  There is mild concentric left ventricular hypertrophy.The left   ventricular wall   motion is normal.The left ventricular ejection fraction is normal.The   left   ventricular ejection fraction is 60 - 65%.The right ventricle is normal   in   size and function.The left atrium is severely dilated.The left atrial   volume   index is 50 cc/m2 (normal <34cc/m2)Right atrial size is normal.Mildly   calcified trileaflet aortic valve.No aortic regurgitation noted.Mitral   annular   calcification noted.Structurally normal mitral valve.Probably moderate to   severe mitral regurgitation.Structurally normal tricuspid valve.There is   mild   to moderate tricuspid regurgitation.There is mild pulmonary   hypertension.The   pulmonary artery systolic pressure is estimated to be 45   mmHg.Structurally   normal pulmonic valve.There is trace pulmonic regurgitation.There is no   pericardial effusion.No prior echo available for comparison.    < end of copied text >      Cardiac Cath: Pending tomorrow       A/P: 57 year old morbidly obese female, former smoker with PMHx HTN, Chronic diastolic CHF, atrial fibrillation (on eliquis), COPD, Depression presented to [facility] on 2/22/18 complaining of acute onset of SOB and WEINSTEIN while climbing the stairs to her apartment. She was admitted to [facility] for respiratory distress requiring BIPAP, Xray at that time revealed diffuse lung infiltrates and pulmonary edema and she was treated empirically for pneumonia. She was also ruled in for NSTEMI with peak troponin 2.45 and was started on a heparin drip. At [place] patient had paroxysmal atrial fibrillation with RVR and was placed on a cardizem drip. She was then transferred to [facility] on 2/24 under the care of Dr. Evans for cardiac cath and further evaluation. At [facility], TTE revealed EF 55%, moderate-severe MR and CT surgery was consulted for further management.   - Case discussed with Dr. Galvez, patient scheduled to have cardiac cath with Dr. Evans. Plan to be determined based on cath results. In meantime please obtain preoperative workup including CBC, CMP, Hgba1c, TSH, Coags, Type & Screen x 2, Pro-BNP, Lipid panel, Cardiac enzymes, Carotid US, Bedside PFTs, CXR Pa/Lat, EKG   - Former smoker with 45 pack year history, please obtain room air ABG   - NSTEMI, troponin 0.10, please continue to trend to ensure down trending cardiac enzymes. Patient on heparin drip, managed by primary team. Goal PTT 50-70   - Continue medical management as per primary team   - Will continue to follow

## 2018-02-25 NOTE — PROGRESS NOTE ADULT - SUBJECTIVE AND OBJECTIVE BOX
Interventional Cardiology PA Adult Progress Note    Subjective Assessment:  Pt was seen in am, does not have any complaints. Agreeable with cath tomorrow.   	  MEDICATIONS:  furosemide    Tablet 20 milliGRAM(s) Oral two times a day  losartan 25 milliGRAM(s) Oral daily  metoprolol     tartrate 25 milliGRAM(s) Oral three times a day  atorvastatin 40 milliGRAM(s) Oral at bedtime  aspirin  chewable 81 milliGRAM(s) Oral daily  heparin  Infusion. 1400 Unit(s)/Hr IV Continuous <Continuous>  heparin  Injectable 8500 Unit(s) IV Push every 6 hours PRN  heparin  Injectable 4000 Unit(s) IV Push every 6 hours PRN  influenza   Vaccine 0.5 milliLiter(s) IntraMuscular once      	    [PHYSICAL EXAM:  TELEMETRY:  T(C): 35.8 (02-25-18 @ 08:25), Max: 36.5 (02-25-18 @ 00:12)  HR: 58 (02-25-18 @ 11:27) (58 - 69)  BP: 129/61 (02-25-18 @ 11:27) (94/51 - 151/86)  RR: 18 (02-25-18 @ 11:27) (16 - 18)  SpO2: 95% (02-25-18 @ 11:27) (94% - 96%)  Wt(kg): --  I&O's Summary    24 Feb 2018 07:01  -  25 Feb 2018 07:00  --------------------------------------------------------  IN: 1274 mL / OUT: 1900 mL / NET: -626 mL    25 Feb 2018 07:01  -  25 Feb 2018 13:17  --------------------------------------------------------  IN: 180 mL / OUT: 300 mL / NET: -120 mL        Renae:  Central/PICC/Mid Line:                                         Appearance: Normal	  HEENT:   Normal oral mucosa, PERRL, EOMI	  Neck: Supple, - JVD; no  Carotid Bruit   Cardiovascular: Normal S1 S2, No JVD, No murmurs,   Respiratory: Lungs clear to auscultation/No Rales, Rhonchi, Wheezing	  Gastrointestinal:  Soft, Non-tender, + BS	  Skin: No rashes, No ecchymoses, No cyanosis  Extremities: Normal range of motion, No clubbing, cyanosis or +1 pitting edema  Vascular: Peripheral pulses palpable 1+ bilaterally  Neurologic: Non-focal  Psychiatry: A & O x 3, Mood & affect appropriate      	    ECG:  	  RADIOLOGY:   DIAGNOSTIC TESTING:  [ ] Echocardiogram:  [ ]  Catheterization:  [ ] Stress Test:    [ ] ARABELLA  OTHER: 	    LABS:	 	  CARDIAC MARKERS:                                  12.0   7.8   )-----------( 265      ( 25 Feb 2018 06:58 )             38.0     02-25    139  |  98  |  16  ----------------------------<  92  3.8   |  30  |  1.09    Ca    8.9      25 Feb 2018 06:58  Mg     2.3     02-25    TPro  7.2  /  Alb  3.8  /  TBili  0.6  /  DBili  x   /  AST  33  /  ALT  27  /  AlkPhos  70  02-23    proBNP:   Lipid Profile:   HgA1c:   TSH:   PT/INR - ( 23 Feb 2018 18:04 )   PT: 13.0 sec;   INR: 1.17          PTT - ( 25 Feb 2018 06:58 )  PTT:81.2 sec    ASSESSMENT/PLAN: 	        DVT ppx:  Dispo:

## 2018-02-25 NOTE — PROGRESS NOTE ADULT - PROBLEM SELECTOR PLAN 2
- in NSR currently. No conversion pauses. AFIB was started about 2 years ago; was less in frequency but has been more recently.   - Continue Metoprolol 25 mg TID  - On amiodarone 200 mg daily at home. Got a dose this morning.  - Due to prolonged QTc, I discontinued Amio order until further notice from EP. DR. Wei is called for consult

## 2018-02-25 NOTE — PROGRESS NOTE ADULT - PROBLEM SELECTOR PLAN 3
R/O Prolonged Q-T interval on ECG.  Plan: QTc today 580ms  with NSR at 62 bpm.   EKG from [place]:  on 2/22/18 showed NSR at 79 bpm with QTc 493 ms  on 2/23/18 showed NSR at 80 bpm with QTc 530 ms.   Plan to stop Azithromycin and Lexapro.  No need for further antibiotics as pt has no clinical symptoms suspected of PNA.   - Dr. Wei is called for evaluation for Amio continuation in setting of prolonged QTc   - Plan for daily EKG while here   - Keep K > 4 (supplemented today) and mg > 2. R/O Prolonged Q-T interval on ECG.  Plan: QTc today 580ms  with NSR at 62 bpm.   EKG from [place]:  on 2/22/18 showed NSR at 79 bpm with QTc 493 ms  on 2/23/18 showed NSR at 80 bpm with QTc 530 ms, trending down today is 498-505, discuss with Dr. Wei if ok to restart medication  Plan to stop Azithromycin and Lexapro.  No need for further antibiotics as pt has no clinical symptoms suspected of PNA.   - Dr. Wei is called for evaluation for Amio continuation in setting of prolonged QTc   - Plan for daily EKG while here   - Keep K > 4 (supplemented today) and mg > 2.

## 2018-02-25 NOTE — PROGRESS NOTE ADULT - ASSESSMENT
56 y/o F with obesity, former smoker, HTN, pAFIB (on Eliquis at home), COPD, diastolic CHF (chronic), depression, presented to [facility] on 2/22/18 with SOB and orthopnea. Lactate acid there at 4.5 on admission.  Ruled in NSTEMI with peak Troponin at [facility] (0.29 --> 1.83). Here Troponin 0.1 on 2/23/17 at arrival to [facility].  She was given meropenem, Rocephine and Azithromycin on 2/22/18 at [facility] based on CT chest there that showed CHF/fluid overload but cannot r/o PNA.  She was put on BiPAP and was weaned off it.  Hospital course at [facility] also complicated by AFIB RVR episodes and was started on Cardizem gtt there.    She has been in NSR this morning.  Awaiting for cath on monday.   Echo on 2/23/18 at [facility], technically limited study, showed EF 50-55%, mild LVH, and moderate MR.

## 2018-02-25 NOTE — PROGRESS NOTE ADULT - PROBLEM SELECTOR PLAN 4
- Chest Xray and CT chest no contrast showed fluid overload   - Pt on Lasix 20mg daily at home. BNP 8000s on transfer to [facility].   - Start Lasix 20mg IV BID inpatient. Strict Is and Os. No patel as pt is mobile.  - Goal is for negative 1-1.5 liter per today   - Echo at [place] technically limited study with EF 50-55%. Mod MR. Echo ordered to be done on 2/24/18 with [facility] for better study.   -ECHO in [facility] on this admission indicated mild concentric LVH, LV wall motion is normal with EF of 60-65%, LA dilated, moderate to severe MR, mild to moderate MR, mild pulmonary HTN, trace MI  -CT surgery was called for consult, need pre-op work-up which includes CBC/CMP; HgbA1C, TSH, coags, type and screen, pro BNP, lipid panel, carotid US, bedside PFT, CXR PA/Lat, ECG, room air ABG, trops

## 2018-02-26 NOTE — PROGRESS NOTE ADULT - PROBLEM SELECTOR PLAN 5
No clinical indication for PNA. CXR here with pulmonary vascular congestion.   - Stopping Azithromycin (got a dose this morning).
ABX were stopped previously as no clinical indication for PNA as per prior note.    Case discussed with Dr. Evans and Dr. Lopez    Dispo: for cardiac cath. today, f/u CT surgery recommendations
- No clinical indication for PNA. CXR here with pulmonary vascular congestion.   - Stopping Azithromycin (got a dose this morning)

## 2018-02-26 NOTE — PROGRESS NOTE ADULT - PROBLEM SELECTOR PLAN 3
S/p IV diuresis. On PO Lasix 20mg BID.  For moderate to severe MR CT surgery consulted, pre-op work up ordered

## 2018-02-26 NOTE — PROGRESS NOTE ADULT - PROBLEM SELECTOR PLAN 2
Currently in SR. Resume Amiodarone 200mg today per EP. Continue Metoprolol.  EP following.   On Heparin gtt

## 2018-02-26 NOTE — PROGRESS NOTE ADULT - SUBJECTIVE AND OBJECTIVE BOX
S: Pt seen and examined bedside.  Patient denies C/P, SOB, N/V, dizziness, palpitations, and diaphoresis.  Pt denies fever/chills, dysuria, abdominal pain, diarrhea, and cough    O: Vital Signs Last 24 Hrs  T(C): 36 (2018 09:20), Max: 36.1 (2018 06:00)  T(F): 96.8 (2018 09:20), Max: 97 (2018 06:00)  HR: 62 (2018 09:47) (54 - 62)  BP: 111/62 (2018 12:32) (110/56 - 150/80)  RR: 17 (2018 09:47) (16 - 18)  SpO2: 95% (2018 09:47) (94% - 97%)    PHYSICAL EXAM:  GEN: NAD  PULM:  CTA B/L  CARD: No JVD B/L, RRR, S1 and S2   ABD: +BS, NT, soft/ND	  EXT: No Edema B/L LE  PULSES: 2+ Radial B/L, 1+ Femoral Pulse B/L (No Bruit B/L), DP and PT +Doppler B/L LE  NEURO: A+Ox3, no focal deficit    LABS:                        13.2   9.0   )-----------( 286      ( 2018 07:08 )             40.5         141  |  101  |  21  ----------------------------<  99  4.0   |  28  |  1.16    Ca    9.2      2018 07:08  Mg     2.1         TPro  7.1  /  Alb  3.8  /  TBili  0.5  /  DBili  x   /  AST  18  /  ALT  22  /  AlkPhos  74      PT/INR - ( 2018 07:08 )   PT: 11.4 sec;   INR: 1.03          PTT - ( 2018 07:08 )  PTT:57.9 sec     @ 07:01  -   @ 07:00  --------------------------------------------------------  IN: 1008 mL / OUT: 2900 mL / NET: -1892 mL    Daily Weight in k.6 (2018 06:00)    RADIOLOGY & ADDITIONAL TESTS: S: Pt seen and examined bedside.  Patient denies C/P, SOB, N/V, dizziness, palpitations, and diaphoresis.  Pt denies fever/chills, dysuria, abdominal pain, diarrhea, and cough    O: Vital Signs Last 24 Hrs  T(C): 36 (2018 09:20), Max: 36.1 (2018 06:00)  T(F): 96.8 (2018 09:20), Max: 97 (2018 06:00)  HR: 62 (2018 09:47) (54 - 62)  BP: 111/62 (2018 12:32) (110/56 - 150/80)  RR: 17 (2018 09:47) (16 - 18)  SpO2: 95% (2018 09:47) (94% - 97%)    PHYSICAL EXAM:  GEN: NAD  PULM:  CTA B/L  CARD: No JVD B/L, RRR, S1 and S2   ABD: +BS, NT, soft/ND	  EXT: No Edema B/L LE  PULSES: 2+ Radial B/L, 1+ Femoral Pulse B/L (No Bruit B/L), DP and PT +Doppler B/L LE  NEURO: A+Ox3, no focal deficit    LABS:                        13.2   9.0   )-----------( 286      ( 2018 07:08 )             40.5         141  |  101  |  21  ----------------------------<  99  4.0   |  28  |  1.16    Ca    9.2      2018 07:08  Mg     2.1         TPro  7.1  /  Alb  3.8  /  TBili  0.5  /  DBili  x   /  AST  18  /  ALT  22  /  AlkPhos  74      PT/INR - ( 2018 07:08 )   PT: 11.4 sec;   INR: 1.03          PTT - ( 2018 07:08 )  PTT:57.9 sec     @ 07:01  -   @ 07:00  --------------------------------------------------------  IN: 1008 mL / OUT: 2900 mL / NET: -1892 mL    Daily Weight in k.6 (2018 06:00)

## 2018-02-26 NOTE — PROGRESS NOTE ADULT - PROBLEM SELECTOR PLAN 4
QTC previously 560, now 476. Amiodarone resumed per EP as above. Daily EKG.  Azithromycin and Lexapro previously stopped

## 2018-02-26 NOTE — PROGRESS NOTE ADULT - ASSESSMENT
56 y/o Obese F Ex-Smoker w/ PMHX HTN, paroxysmal Afib on Eliquis, COPD, Chronic Diastolic CHF who original presented to [facility] w/ Acute on Chronic Diastolic CHF, noted to have Afib w/ RVR, R/I NSTEMI, at least moderate MR on Echo, transferred to [facility] for cardiac catheterization

## 2018-02-26 NOTE — PROGRESS NOTE ADULT - PROBLEM SELECTOR PLAN 1
Continue Heparin gtt. Eliquis on hold. ASA 324mg and Plavix 600mg given this AM as per Dr. Evans. On Metoprolol/Losartan/Lipitor. For cardiac cath. today. Troponin down trended.  Echo at [facility] showed mild LVH, EF 60-65%, mod. to severe MR, normal wall motion, mild Pulm HTN, PA systolic pressure 45mmHg

## 2018-02-26 NOTE — PROGRESS NOTE ADULT - SUBJECTIVE AND OBJECTIVE BOX
EPS Progress Note    S: no complaints. Awaiting cath.     TELE: NSR 50-60 bpm. no afib   EK18: sinus yessica at 56 bpm. QTc 476 ms.    O: T(C): 36 (18 @ 09:20), Max: 36.1 (18 @ 06:00)  HR: 62 (18 @ 09:47) (54 - 62)  BP: 140/65 (18 @ 09:47) (110/56 - 150/80)  RR: 17 (18 @ 09:47) (16 - 18)  SpO2: 95% (18 @ 09:47) (94% - 97%)       PHYSICAL  General:  NAD      neck: No JVD    Chest:  slight rales bilaterally. No wheezing. No rhonchi   Cardiac:   + s1/s2, RRR, no murmur    Abdomen:  +BS , soft ND/NT  Extremities: No LE edema bilaterally.   Neuro: AAOx 3. No focal deficit    LABS:                        13.2   9.0   )-----------( 286      ( 2018 07:08 )             40.5         141  |  101  |  21  ----------------------------<  99  4.0   |  28  |  1.16    Ca    9.2      2018 07:08  Mg     2.1         TPro  7.1  /  Alb  3.8  /  TBili  0.5  /  DBili  x   /  AST  18  /  ALT  22  /  AlkPhos  74      PT/INR - ( 2018 07:08 )   PT: 11.4 sec;   INR: 1.03          PTT - ( 2018 07:08 )  PTT:57.9 sec    MEDICATIONS:  aspirin  chewable 81 milliGRAM(s) Oral daily  atorvastatin 40 milliGRAM(s) Oral at bedtime  furosemide    Tablet 20 milliGRAM(s) Oral two times a day  heparin  Infusion. 1400 Unit(s)/Hr IV Continuous <Continuous>  heparin  Injectable 8500 Unit(s) IV Push every 6 hours PRN  heparin  Injectable 4000 Unit(s) IV Push every 6 hours PRN  influenza   Vaccine 0.5 milliLiter(s) IntraMuscular once  losartan 25 milliGRAM(s) Oral daily  metoprolol     tartrate 25 milliGRAM(s) Oral three times a day  nystatin Powder 1 Application(s) Topical three times a day

## 2018-02-26 NOTE — PROGRESS NOTE ADULT - PROBLEM SELECTOR PROBLEM 2
Paroxysmal atrial fibrillation with RVR

## 2018-02-26 NOTE — PROGRESS NOTE ADULT - ASSESSMENT
56 y/o F with obesity, former smoker, HTN, pAFIB (on Eliquis at home), COPD, diastolic CHF (chronic), depression, here for NSTEMI.  Pt has remained in NSR since 2/24/18.  Her QTc was noted to be prolonged (up to 580 ms on 2/24/18) secondary to Azithromycin use while on Amiodarone.  Azithromycin, Amio and Lexapro were on hold since 2/24/18.  EF is normal on echo. LA is dilated (likely from MR vs pAFIB).   - QTc normalizes today (476 ms).  Resume Amiodarone 200 mg once daily to help her maintain in NSR.   - Amiodarone is for now a short term agent as she is young and we would not want to continue Amiodarone long term given potential side effects.  We agree for amio use here to keep her in sinus rhythm and to facilitate her going throw cardiac cath.  She can go home on Amio 200 mg daily but will need to follow up with Dr. Wei in 1 month.    - Primary team to address the Mitral valve issue (moderate to severe on 2D-Echo).

## 2018-02-26 NOTE — PROGRESS NOTE ADULT - PROBLEM SELECTOR PROBLEM 1
NSTEMI (non-ST elevated myocardial infarction)

## 2018-02-26 NOTE — PROGRESS NOTE ADULT - ATTENDING COMMENTS
Agree with PA assessment and plan above.   NSTEMI- minimal trop elevation in the setting of decompensated heart failure, AF w/ RVR and moderate-severe MR. DAPT held in anticipation of possible MV surgery. D/w the interventional cardiologist- he would like the patient loaded with DAPT now. Plavix 600 mg po,  po given. She is planned for cardiac catheterization. Depending on the results, she will be referred for MV surgery +/- CABG or possible PCI. mitral valve regurgitation will be reassessed after heart failure optimization and if she continues to be symptomatic, with at least moderate regurgitation, she will be indicated for mitral valve surgery

## 2018-02-27 PROBLEM — Z00.00 ENCOUNTER FOR PREVENTIVE HEALTH EXAMINATION: Status: ACTIVE | Noted: 2018-01-01

## 2018-02-27 NOTE — DISCHARGE NOTE ADULT - PATIENT PORTAL LINK FT
You can access the [facility], offered by [facility], by registering with the following website: [email or web address]

## 2018-02-27 NOTE — DISCHARGE NOTE ADULT - CARE PROVIDER_API CALL
Alex Bradford), Internal Medicine  [address]  [place] [address]  Phone: [phone]  Fax: [phone]    Ronel Wei  Phone: (   )    -  Fax: (   )    -

## 2018-02-27 NOTE — DISCHARGE NOTE ADULT - MEDICATION SUMMARY - MEDICATIONS TO TAKE
I will START or STAY ON the medications listed below when I get home from the hospital:    aspirin 81 mg oral delayed release tablet  -- 1 tab(s) by mouth once a day   -- Swallow whole.  Do not crush.  Take with food or milk.    -- Indication: For stent in your heart    losartan 25 mg oral tablet  -- 1 tab(s) by mouth once a day  -- Indication: For blood pressure    amiodarone 200 mg oral tablet  -- 1 tab(s) by mouth once a day  -- Indication: For atrial fibrillation    Eliquis 5 mg oral tablet  -- 1 tab(s) by mouth 2 times a day  -- Indication: For atrial fibrillation    Lexapro 10 mg oral tablet  -- 1 tab(s) by mouth once a day  -- Indication: For depression    atorvastatin 40 mg oral tablet  -- 1 tab(s) by mouth once a day (at bedtime)  -- Indication: For CHolesterol    clopidogrel 75 mg oral tablet  -- 1 tab(s) by mouth once a day  -- Indication: For stent in your heart    Toprol-XL 50 mg oral tablet, extended release  -- 1 tab(s) by mouth once a day   -- It is very important that you take or use this exactly as directed.  Do not skip doses or discontinue unless directed by your doctor.  May cause drowsiness.  Alcohol may intensify this effect.  Use care when operating dangerous machinery.  Some non-prescription drugs may aggravate your condition.  Read all labels carefully.  If a warning appears, check with your doctor before taking.  Swallow whole.  Do not crush.  Take with food or milk.  This drug may impair the ability to drive or operate machinery.  Use care until you become familiar with its effects.    -- Indication: For blood pressure, heart    nystatin 100,000 units/g topical powder  -- 1 application on skin 3 times a day  -- Indication: For topical agent    furosemide 20 mg oral tablet  -- 1 tab(s) by mouth once a day  -- Indication: For heart

## 2018-02-27 NOTE — DISCHARGE NOTE ADULT - PLAN OF CARE
You underwent a cardiac catheterization 2/26/18 and the blockage in your LCX (artery in your heart) was opened with stent placement.  NEVER MISS A DOSE OF ASPIRIN OR PLAVIX; IF YOU DO, YOU ARE AT RISK OF YOUR STENT CLOSING AND HAVING A HEART ATTACK. DO NOT STOP THESE TWO MEDICATIONS UNLESS INSTRUCTED TO DO SO BY YOUR CARDIOLOGIST  Please follow up with your cardiologist Dr. Bradford in 1-2 week to check on your heart. Please take aspirin 81 mg daily for 1 month and then continue to take your Plavix 75 mg daily and Eliquis 5 mg two times daily. please have an repeat EKG with your cardiologist in 1 week. your QT interval in your EKG was prolonged. It is currently normal and not prolonged. please have an repeat EKG with your cardiologist in 1 week. please continue to take your amiodarone 200 mg daily and Eliquis 5 mg two times daily You have been given the option to follow up with Dr. Ronel Wei at [place] on 3rd friday of the month. please call to schedule an appointment [phone].  Dr. Wei would like to address duration of Amio in the near future. The Side effects of amiodarone and interactions of it with other medicine has been expalined in great detail to you by our electrophysiology team. Please inform any provider who prescribes meds to know that you are on Amiodarone.    - You also expressed that you wanted to follow up with electrophysiologist at [place]. Please make sure to follow up either with electrophysiologist at [place] or Dr. Wei. Please do not stop taking Amiodarone by yourself. please continue to take furosemide 20 mg daily. please continue to take your home Lexapro. The medication Lexapro can increase the QT interval in EKG. Your current QT interval in EKG is normal but please have an repeat EKG in 1 week. please have an repeat ECHO of your heart in 1 month. We performed an echo of your heart this admission which revealed moderate to severe mitral regurgitation which is backflow of blood caused by the heartsmitral valve to close tightly; however the regurgitation in the cardiac angiogram was not that severe. You expressed that you would want to see the CT surgery team in [place] regarding the mitral regurgitation. Please follow up with them in 1-2 week and have an repeat ECHO of your heart in 1 month. please take aspirin 81 mg daily, plavix 75 mg daily, eliquis 5 mg two times daily, toprol 50 mg daily, atorvastatin 40 mg daily and losartan 25 mg daily

## 2018-02-27 NOTE — DISCHARGE NOTE ADULT - INSTRUCTIONS
•	Your procedure was done through your  wrist  •	You do not need to keep this area covered and you may shower  •	Please avoid any heavy lifting  (no more than 3 to 5 lbs) or strenuous activity for five days  •	If you develop any swelling, bleeding, hardening of the skin (hematoma formation), acute pain, numbness/tingling  in your arm please contact your doctor immediately or call our 24/7 line: [phone]

## 2018-02-27 NOTE — DISCHARGE NOTE ADULT - ADDITIONAL INSTRUCTIONS
please follow up with Dr. Bradford in 1-2 weeks.  please follow up with Dr. Wei at [place] on 3rd friday of the month or the electrophysiology team at [facility].  please follow up with Cardiothoracic surgery team at [facility]

## 2018-02-27 NOTE — DISCHARGE NOTE ADULT - HOSPITAL COURSE
. 56 y/o Obese F Ex-Smoker w/ PMHX HTN, paroxysmal Afib on Eliquis, COPD, Chronic Diastolic CHF who original presented to [facility] w/ Acute on Chronic Diastolic CHF, noted to have Afib w/ RVR, R/I NSTEMI, at least moderate MR on Echo, transferred to [facility] for cardiac catheterization. CATH 2/26: LM normal, mLAD 30%, pLCx 75% with ruptured plaque & thrombus confirmed by OCT, RCA small co-dominant, LVEF normal, EDP 11.  Successful HAL prox LCx. Rt radial access, trace MR on cath, Echo at [facility] showed mild LVH, EF 60-65%, mod. to severe MR, normal wall motion, mild Pulm HTN, PA systolic pressure 45mmHg. CTS consulted for MR .pt does not want to have CT surgery at [facility] if needed for mitral valve and wants to f/u at [place] for 2nd opinion and pt. to have repeat echo in 1 month. Pt. had prolonged QTC previously 560, now 441.  Amiodarone and Lexapro resumed per EP and pt. to get repeat EKG in cardiologist office which has been explained to pt. Pt. is currently in SR, Pt. examined at bedside this am. Pt. comfortable, casper any CP, SOB, dizziness and palpitations. VSS, Labs stable, Home meds reviwed with Dr. Lopez and pt. to be d/c on aspirin 81 mg daily, plavix 75 mg daily, eliquis 5 mg two times daily, toprol 50 mg daily, atorvastatin 40 mg daily and losartan 25 mg daily, amiodarone 200 mg daily and Furosemide 20 mg daily and Lexapro. Pt. to take aspirin for 1 month only and to continue on Eliuis 5 mg BID and Plavix 75 mg as per Dr. Evans;pt. is aware. Pt. stable to be d/c as per Dr. Lopez and pt. to f/u with Dr. Bradford in 1-2 weeks. Pt. has been given the option to f/u with Dr. Wei at [place] on 3rd friday of the month or the electrophysiology team at [facility] regarding continuation of amiodarone and follow up with Cardiothoracic surgery team at [facility].

## 2018-02-27 NOTE — CHART NOTE - NSCHARTNOTEFT_GEN_A_CORE
Discussed findings of cardiac catheterization with Dr. Galvez and made him aware of the patient's wishes to seek surgical intervention at an outside facility.  Will sign off on the patient.  Please re-consult if needed.

## 2018-02-27 NOTE — DISCHARGE NOTE ADULT - CARE PLAN
Principal Discharge DX:	NSTEMI (non-ST elevated myocardial infarction)  Goal:	please take aspirin 81 mg daily, plavix 75 mg daily, eliquis 5 mg two times daily, toprol 50 mg daily, atorvastatin 40 mg daily and losartan 25 mg daily  Assessment and plan of treatment:	You underwent a cardiac catheterization 2/26/18 and the blockage in your LCX (artery in your heart) was opened with stent placement.  NEVER MISS A DOSE OF ASPIRIN OR PLAVIX; IF YOU DO, YOU ARE AT RISK OF YOUR STENT CLOSING AND HAVING A HEART ATTACK. DO NOT STOP THESE TWO MEDICATIONS UNLESS INSTRUCTED TO DO SO BY YOUR CARDIOLOGIST  Please follow up with your cardiologist Dr. Bradford in 1-2 week to check on your heart. Please take aspirin 81 mg daily for 1 month and then continue to take your Plavix 75 mg daily and Eliquis 5 mg two times daily.  Secondary Diagnosis:	Prolonged Q-T interval on ECG  Goal:	please have an repeat EKG with your cardiologist in 1 week.  Assessment and plan of treatment:	your QT interval in your EKG was prolonged. It is currently normal and not prolonged. please have an repeat EKG with your cardiologist in 1 week.  Secondary Diagnosis:	Afib  Goal:	please continue to take your amiodarone 200 mg daily and Eliquis 5 mg two times daily  Assessment and plan of treatment:	You have been given the option to follow up with Dr. Ronel Wei at [place] on 3rd friday of the month. please call to schedule an appointment [phone].  Dr. Wei would like to address duration of Amio in the near future. The Side effects of amiodarone and interactions of it with other medicine has been expalined in great detail to you by our electrophysiology team. Please inform any provider who prescribes meds to know that you are on Amiodarone.    - You also expressed that you wanted to follow up with electrophysiologist at [place]. Please make sure to follow up either with electrophysiologist at [place] or Dr. Wei. Please do not stop taking Amiodarone by yourself.  Secondary Diagnosis:	Acute on chronic congestive heart failure  Goal:	please continue to take furosemide 20 mg daily.  Secondary Diagnosis:	Depression  Goal:	please continue to take your home Lexapro.  Assessment and plan of treatment:	The medication Lexapro can increase the QT interval in EKG. Your current QT interval in EKG is normal but please have an repeat EKG in 1 week.  Secondary Diagnosis:	Mitral regurgitation  Goal:	please have an repeat ECHO of your heart in 1 month.  Assessment and plan of treatment:	We performed an echo of your heart this admission which revealed moderate to severe mitral regurgitation which is backflow of blood caused by the heartsmitral valve to close tightly; however the regurgitation in the cardiac angiogram was not that severe. You expressed that you would want to see the CT surgery team in [place] regarding the mitral regurgitation. Please follow up with them in 1-2 week and have an repeat ECHO of your heart in 1 month.

## 2018-02-27 NOTE — DISCHARGE NOTE ADULT - PROVIDER TOKENS
TOKEN:'[id number]',FREE:[LAST:[Eriberto],FIRST:[Kaingrid],PHONE:[(   )    -],FAX:[(   )    -]]

## 2018-02-27 NOTE — DISCHARGE NOTE ADULT - SECONDARY DIAGNOSIS.
Prolonged Q-T interval on ECG Afib Acute on chronic congestive heart failure Depression Mitral regurgitation

## 2018-02-27 NOTE — PROGRESS NOTE ADULT - SUBJECTIVE AND OBJECTIVE BOX
EPS Progress Note    S: s/p cath yesterday. No event or complaints.     Tele: NSR 60s.   EK18: sinus yessica 58 bpm. Qtc 441 ms.     O: T(C): 35.7 (18 @ 08:40), Max: 36.4 (18 @ 00:20)  HR: 58 (18 @ 09:00) (58 - 69)  BP: 122/55 (18 @ 09:00) (101/56 - 128/60)  RR: 16 (18 @ 09:00) (16 - 18)  SpO2: 93% (18 @ 09:00) (93% - 98%)    PHYSICAL  General:  NAD        Chest:  CTA B/L   Cardiac:   + s1/s2, yessica  Abdomen:  +BS , soft ND/NT  Extremities: No LE edema    LABS:                        13.5   9.5   )-----------( 290      ( 2018 08:04 )             41.2         137  |  97  |  18  ----------------------------<  88  4.2   |  29  |  1.26    Ca    9.2      2018 08:04  Mg     2.1         TPro  7.1  /  Alb  3.8  /  TBili  0.5  /  DBili  x   /  AST  18  /  ALT  22  /  AlkPhos  74  02    PT/INR - ( 2018 08:04 )   PT: 15.3 sec;   INR: 1.37          PTT - ( 2018 08:04 )  PTT:34.2 sec    MEDICATIONS:  amiodarone    Tablet 200 milliGRAM(s) Oral daily  apixaban 5 milliGRAM(s) Oral every 12 hours  atorvastatin 40 milliGRAM(s) Oral at bedtime  clopidogrel Tablet 75 milliGRAM(s) Oral daily  furosemide    Tablet 20 milliGRAM(s) Oral two times a day  influenza   Vaccine 0.5 milliLiter(s) IntraMuscular once  losartan 25 milliGRAM(s) Oral daily  metoprolol     tartrate 25 milliGRAM(s) Oral three times a day  nystatin Powder 1 Application(s) Topical three times a day

## 2018-03-26 ENCOUNTER — APPOINTMENT (OUTPATIENT)
Dept: HEART AND VASCULAR | Facility: CLINIC | Age: 58
End: 2018-03-26

## 2023-12-20 NOTE — PROGRESS NOTE ADULT - SUBJECTIVE AND OBJECTIVE BOX
CHIEF COMPLAINT:  Prolonged QTc    HISTORY OF PRESENT ILLNESS:    Mr. Vigil is morbidly obese female, former smoker, PMHx HTN, chronic diastolic CHF, paroxysmal afib (on Eliquis), COPD (no hosp/intubations/no meds at home), depression presented to [place] on 2/22/18 endorsing sudden onset of shortness of breath lasting 1 hour prior to admission. Pt has baseline 2 pillow orthopnea. Denied chest pain, dizziness, palpitations, nausea, syncope, LE swelling, fever, chills. Pt was admitted for respiratory distress requiring BIPAP. WBC 13.1. XRay chest showed diffuse lung infiltrates and pulmonary edema. CT chest 2/22 showed CHF/fluid overload but cannot r/o PNA. Pulmonary artery hypertension. UA negative. Pt was given Meropenem 1g IV, Rocephin x 1, and Azithromycin 500mg x 1. Pt was weaned off BIPAP to NC on 2/22 PM. Pt ruled in NSTEMI with peak troponin of 2.45 and was started on a heparin drip. EKG in afib had nonspecific ST changes. Pt converted to NSR on 2/23/18 at 10am with no ischemic changes. Pt converted back to Afib with RVR and was put on a Cardizem drip on 2/23 at 4am. Echo on 2/23/18, technically limited study, EF 50-55%, mild LVH, and moderate MR. Pt was transferred to [facility] for cardiac catheterization. On arrival to [facility] pt asymptomatic. O2 sat 96% on 4LNC. Pt afebrile, no leukocytosis. EKG Afib with RVR to 120s with nonspecifc ST changes. Lungs CTA b/l. CXR shows mild increased vascular markings. No consolidations. Pt denies chest pain, sob, dizziness, cough, dizziness, palpitations, nausea. Dr. Dumont contacted who would like her HR better controlled prior to cardiac catheterization. Plan is to do cardiac cath on Monday with Dr Evans.     PAST MEDICAL & SURGICAL HISTORY:  NSTEMI (non-ST elevated myocardial infarction)  Paroxysmal atrial fibrillation  Chronic diastolic CHF (congestive heart failure)  No significant past surgical history        MEDICATIONS:  furosemide   Injectable 20 milliGRAM(s) IV Push two times a day  losartan 25 milliGRAM(s) Oral daily  metoprolol     tartrate 25 milliGRAM(s) Oral three times a day  atorvastatin 40 milliGRAM(s) Oral at bedtime  aspirin  chewable 81 milliGRAM(s) Oral daily  heparin  Infusion. 1300 Unit(s)/Hr IV Continuous <Continuous>  heparin  Injectable 8500 Unit(s) IV Push every 6 hours PRN  heparin  Injectable 4000 Unit(s) IV Push every 6 hours PRN  influenza   Vaccine 0.5 milliLiter(s) IntraMuscular once        ALLERGIES:  penicillins (Unknown)      FAMILY HISTORY:  Family history of coronary artery disease (Mother, Sibling)    REVIEW OF SYSTEMS:    CONSTITUTIONAL: No fever, weight loss, or fatigue  EYES: No eye pain, visual disturbances, or discharge  ENMT:  No difficulty hearing, tinnitus, vertigo; No sinus or throat pain  NECK: No pain or stiffness  BREASTS: No pain, masses, or nipple discharge  RESPIRATORY: No cough, wheezing, chills or hemoptysis; No Shortness of Breath  CARDIOVASCULAR: No chest pain, palpitations, dizziness, or leg swelling  GASTROINTESTINAL: No abdominal or epigastric pain. No nausea, vomiting, or hematemesis; No diarrhea or constipation. No melena or hematochezia.  GENITOURINARY: No dysuria, frequency, hematuria, or incontinence  NEUROLOGICAL: No headaches, memory loss, loss of strength, numbness, or tremors  SKIN: No itching, burning, rashes, or lesions   LYMPH Nodes: No enlarged glands  ENDOCRINE: No heat or cold intolerance; No hair loss  MUSCULOSKELETAL: No joint pain or swelling; No muscle, back, or extremity pain  PSYCHIATRIC: No depression, anxiety, mood swings, or difficulty sleeping  HEME/LYMPH: No easy bruising, or bleeding gums  ALLERY AND IMMUNOLOGIC: No hives or eczema	      PHYSICAL EXAM:  T(C): 36.3 (02-24-18 @ 13:20), Max: 36.5 (02-24-18 @ 01:01)  HR: 61 (02-24-18 @ 11:43) (57 - 124)  BP: 114/62 (02-24-18 @ 11:43) (57/- - 146/78)  RR: 18 (02-24-18 @ 11:43) (16 - 20)  SpO2: 99% (02-24-18 @ 11:43) (96% - 99%)  Wt(kg): --  I&O's Summary    23 Feb 2018 07:01  -  24 Feb 2018 07:00  --------------------------------------------------------  IN: 116 mL / OUT: 600 mL / NET: -484 mL    24 Feb 2018 07:01  -  24 Feb 2018 17:48  --------------------------------------------------------  IN: 808 mL / OUT: 0 mL / NET: 808 mL        Appearance: Normal	  HEENT:   Normal oral mucosa, PERRL, EOMI	  Cardiovascular: Normal S1 S2, No JVD, No murmurs, No edema  Respiratory: Lungs clear to auscultation	  Gastrointestinal:  Soft, Non-tender, + BS	  Neurologic: A&O x 3, Non-focal  Extremities: Normal range of motion, No clubbing, cyanosis or edema  Vascular: Peripheral pulses palpable 2+ bilaterally    	  LABS:	 	    CARDIAC MARKERS:                                  11.8   8.2   )-----------( 255      ( 24 Feb 2018 08:01 )             36.6     02-24    139  |  99  |  14  ----------------------------<  107<H>  3.6   |  28  |  1.05    Ca    8.5      24 Feb 2018 08:01  Mg     2.0     02-24    TPro  7.2  /  Alb  3.8  /  TBili  0.6  /  DBili  x   /  AST  33  /  ALT  27  /  AlkPhos  70  02-23    proBNP: Serum Pro-Brain Natriuretic Peptide: 8166 pg/mL (02-23 @ 18:04)    TELEMETRY:  pAF, now sinus  ECG:  nsr, prolonged QTc          ASSESSMENT/PLAN: 	    Ms. Vigil is a 58 y/o female HTN, chronic diastolic CHF, paroxysmal afib (on Eliquis), and COPD found to have a prolonged QTc in the setting of amiodarone, lexapro, and azithromycin.  Please discontinue all three medications at this time and continue to monitor.  Will treat AF with rate control for the time being.  Will restart amio with QT normalizes.    -maintain hep gtt and metoprolol 80

## 2025-04-07 NOTE — PATIENT PROFILE ADULT. - HAS THE PATIENT HAD A SIGNIFICANT CHANGE IN FUNCTIONAL STATUS DUE TO CVA, HEAD TRAUMA, ORTHOPEDIC TRAUMA/SURGERY, OR FALL, WITH THE WEEK PRIOR TO ADMISSION
[Follow-Up Visit] : a follow-up visit for [Morbid Obesity (BMI>40)] : morbid obesity (bmi>40) [Other___] : [unfilled] [Parent] : parent no